# Patient Record
Sex: MALE | HISPANIC OR LATINO | URBAN - METROPOLITAN AREA
[De-identification: names, ages, dates, MRNs, and addresses within clinical notes are randomized per-mention and may not be internally consistent; named-entity substitution may affect disease eponyms.]

---
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
## 2023-03-07 ENCOUNTER — TELEPHONE (OUTPATIENT)
Dept: BEHAVIORAL HEALTH | Facility: CLINIC | Age: 18
End: 2023-03-07
Payer: COMMERCIAL

## 2023-03-07 NOTE — TELEPHONE ENCOUNTER
Pt is a(n) adult (16+ out of HS) Seeking as eval for Adult EVITA Assessment for evaluation and recommendations. and is interested in Adult Evaluation only - no specific program requested.  Appointment scheduled by:  Parent/Guardian (do not run cost estimate if pt not calling for the appt themselves - send for bens)  Caller name:  Catalino    Caller phone #: [phone]  If in person, please state reason why:  NA  Brief reason for appt:  Pt reports interest in residential programs for substance use    Cost estimate Did not get completed.  Contact information verified/updated: Yes

## 2023-03-14 ENCOUNTER — TELEPHONE (OUTPATIENT)
Dept: BEHAVIORAL HEALTH | Facility: CLINIC | Age: 18
End: 2023-03-14

## 2023-03-14 NOTE — TELEPHONE ENCOUNTER
Patient have video appointment today at 3pm with [facility]. Writer placed a call this morning to check in. Writer got a hold of patient. Patient informed writer that patient have already completed an assessment and will not need the appt today. Writer will inform the provider, and red dot the appt today.

## 2023-03-14 NOTE — TELEPHONE ENCOUNTER
This patient was a same-day late cancellation/no show to his 3:00 pm substance use disorder assessment appointment today on 3/14/2023.  The patient had contact with the [facility] on 3/14/2023, but the patient stated he had already completed a substance use disorder assessment at a different facility and he would need to cancel today's substance use disorder assessment appointment.

## 2025-02-18 ENCOUNTER — OFFICE VISIT (OUTPATIENT)
Dept: BEHAVIORAL HEALTH | Facility: CLINIC | Age: 20
End: 2025-02-18
Payer: COMMERCIAL

## 2025-02-18 VITALS — DIASTOLIC BLOOD PRESSURE: 68 MMHG | HEART RATE: 110 BPM | SYSTOLIC BLOOD PRESSURE: 111 MMHG | OXYGEN SATURATION: 100 %

## 2025-02-18 DIAGNOSIS — F41.8 ANXIETY WITH DEPRESSION: ICD-10-CM

## 2025-02-18 DIAGNOSIS — Z51.81 ENCOUNTER FOR MONITORING OPIOID MAINTENANCE THERAPY: ICD-10-CM

## 2025-02-18 DIAGNOSIS — Z79.891 ENCOUNTER FOR MONITORING OPIOID MAINTENANCE THERAPY: ICD-10-CM

## 2025-02-18 DIAGNOSIS — G47.00 INSOMNIA, UNSPECIFIED TYPE: ICD-10-CM

## 2025-02-18 DIAGNOSIS — F11.20 OPIOID USE DISORDER, SEVERE, DEPENDENCE (H): Primary | ICD-10-CM

## 2025-02-18 LAB
AMPHETAMINE QUAL URINE POCT: NEGATIVE
BARBITURATE QUAL URINE POCT: NEGATIVE
BENZODIAZEPINE QUAL URINE POCT: NEGATIVE
BUPRENORPHINE QUAL URINE POCT: ABNORMAL
COCAINE QUAL URINE POCT: NEGATIVE
CREAT UR-MCNC: 205 MG/DL
CREATININE QUAL URINE POCT: ABNORMAL
INTERNAL QC QUAL URINE POCT: ABNORMAL
MDMA QUAL URINE POCT: NEGATIVE
METHADONE QUAL URINE POCT: NEGATIVE
METHAMPHETAMINE QUAL URINE POCT: NEGATIVE
OPIATE QUAL URINE POCT: NEGATIVE
OXYCODONE QUAL URINE POCT: NEGATIVE
PH QUAL URINE POCT: ABNORMAL
PHENCYCLIDINE QUAL URINE POCT: NEGATIVE
POCT KIT EXPIRATION DATE: ABNORMAL
POCT KIT LOT NUMBER: ABNORMAL
SPECIFIC GRAVITY POCT: 1.01
TEMPERATURE URINE POCT: ABNORMAL
THC QUAL URINE POCT: ABNORMAL

## 2025-02-18 RX ORDER — MIRTAZAPINE 15 MG/1
15-30 TABLET, FILM COATED ORAL AT BEDTIME
Qty: 60 TABLET | Refills: 0 | Status: SHIPPED | OUTPATIENT
Start: 2025-02-18

## 2025-02-18 RX ORDER — CLONIDINE HYDROCHLORIDE 0.1 MG/1
0.1 TABLET ORAL 3 TIMES DAILY PRN
Qty: 30 TABLET | Refills: 0 | Status: SHIPPED | OUTPATIENT
Start: 2025-02-18

## 2025-02-18 RX ORDER — BUPRENORPHINE AND NALOXONE 12; 3 MG/1; MG/1
1 FILM, SOLUBLE BUCCAL; SUBLINGUAL 2 TIMES DAILY
Qty: 30 FILM | Refills: 0 | Status: SHIPPED | OUTPATIENT
Start: 2025-02-18

## 2025-02-18 ASSESSMENT — PATIENT HEALTH QUESTIONNAIRE - PHQ9: SUM OF ALL RESPONSES TO PHQ QUESTIONS 1-9: 13

## 2025-02-18 ASSESSMENT — COLUMBIA-SUICIDE SEVERITY RATING SCALE - C-SSRS
2. HAVE YOU ACTUALLY HAD ANY THOUGHTS OF KILLING YOURSELF?: NO
1. IN THE PAST MONTH, HAVE YOU WISHED YOU WERE DEAD OR WISHED YOU COULD GO TO SLEEP AND NOT WAKE UP?: NO
6. HAVE YOU EVER DONE ANYTHING, STARTED TO DO ANYTHING, OR PREPARED TO DO ANYTHING TO END YOUR LIFE?: NO

## 2025-02-18 NOTE — NURSING NOTE
[facility]      Rooming information:    Taking  meds from , last use of suboxone was this morning, suboxone 8mg, increase suboxone    Approximate last use of full opioid agonist: 2025, 1/2  gram fentanyl  Taking buprenorphine? No  How much per day? N/a  Number of buprenorphine films/tablets remaining currently: 0  Side effects related to buprenorphine (constipation, dry mouth, sedation?) No   Narcan currently available: No- need refills   Other recent substance use:    Cannabis   NICOTINE-Yes: vape and cigs 1-2 cigs per day   If using nicotine, ready to quit? No    Point of care urine drug screen positive for:  Lab Results   Component Value Date    BUP Screen Positive (A) 2025    BZO Negative 2025    BAR Negative 2025    CASIE Negative 2025    MAMP Negative 2025    AMP Negative 2025    MDMA Negative 2025    MTD Negative 2025    [id number] Negative 2025    OXY Negative 2025    PCP Negative 2025    THC Screen Positive (A) 2025    TEMP 90 F 2025    SGPOCT 1.015 2025       *POC urine drug screen does not screen for Fentanyl      Depression Response    Patient completed the PHQ-9 assessment for depression and scored >9? No  Question 9 on the PHQ-9 was positive for suicidality? No  Does patient have current mental health provider? No  C-SSRS screener risk level. C-SSRS Risk (Lifetime/Recent)  Calculated C-SSRS Risk Score (Lifetime/Recent): No Risk Indicated     Is this a virtual visit? No    I personally notified the following: visit provider          2025     2:00 PM   PHQ Assesment Total Score(s)   PHQ-9 Score 13         Housing needs: stable, living wth mom    Insurance: active     Current legal issues: court, pending    Contact information up to date? On file    3rd Party Involvement mommarycarmen signed  (please obtain MARYCARMEN if pt would like to include)    Rigoberto Fernandez MA  2025  2:51  PM

## 2025-02-18 NOTE — PROGRESS NOTES
[facility] Follow Up    ASSESSMENT/PLAN                                                      1. Opioid use disorder, severe, dependence (H) (Primary)  Patient was lost to follow up and returned to inhaled use, last use 2/11/25. He resumed suboxone from an old prescription, taking 16 mg/day. Last dose was this morning. Reporting persistent withdrawal and cravings.   Increase suboxone to 12 mg BID. Is not interested in sublocade.  Providing narcan access  Referral placed for taylor eval, scheduled for 2/20 at 3 pm  Encouraged plans for residential programming.   Recommend avoiding social and environmental triggers.   - Adult Mental Health  Referral; Future  - naloxone (NARCAN) 4 MG/0.1ML nasal spray; Spray 1 spray (4 mg) into one nostril alternating nostrils once as needed for opioid reversal. every 2-3 minutes until assistance arrives  Dispense: 0.2 mL; Refill: 11  - Buprenorphine HCl-Naloxone HCl (SUBOXONE) 12-3 MG FILM per film; Place 1 Film under the tongue 2 times daily.  Dispense: 30 Film; Refill: 0    2. Encounter for monitoring opioid maintenance therapy  - Drugs of Abuse Screen Urine (POC CUPS) POCT; Standing  - Drug Confirmation Panel Urine with Creat - lab collect; Future  - Drugs of Abuse Screen Urine (POC CUPS) POCT  - Drug Confirmation Panel Urine with Creat - lab collect    3. Insomnia, unspecified type  Has taken mirtazapine in the past but does not recall its effectiveness. Resume mirtazapine.   - mirtazapine (REMERON) 15 MG tablet; Take 1-2 tablets (15-30 mg) by mouth at bedtime.  Dispense: 60 tablet; Refill: 0    4. Anxiety with depression  Reporting increased anxiety impacting sleep. Trazodone and hydroxyzine ineffective for him in the past. Resume mirtazapine, and adding clonidine.   Consider referral for therapy at follow up.   - cloNIDine (CATAPRES) 0.1 MG tablet; Take 1 tablet (0.1 mg) by mouth 3 times daily as needed (opiate withdrawal (restlessness, anxiety,  sweats)).  Dispense: 30 tablet; Refill: 0  - mirtazapine (REMERON) 15 MG tablet; Take 1-2 tablets (15-30 mg) by mouth at bedtime.  Dispense: 60 tablet; Refill: 0       Return in about 15 days (around 3/5/2025) for Follow up, in person 1100.      Patient counseling completed today:  Discussed mechanism of action, potential risks/benefits/side effects of medications and other recommendations above.     Discussed risk of precipitated withdrawal with initiation of buprenorphine in the presence of full opioid agonists.    Reviewed directions for initiation of buprenorphine to reduce risk of precipitated withdrawal and maximize efficacy.    Harm reduction counseling including never use alone, availability of naloxone, risks associated with concurrent use of opioids and benzodiazepines, alcohol, or other sedatives, safer administration as applicable.  Discussed importance of avoiding isolation, building a network of supportive relationships, avoiding people/places/things associated with past use to reduce risk of relapse; including motivational interviewing regarding psychosocial interventions.   SUBJECTIVE                                                          CC/HPI:  Chacho Lucas is a 17 year old male with PMH of opioid use disorder  who presents to the Recovery Clinic for return visit.      First Recovery Clinic visit: 3/8/22  Last date of illicit opioid use: 10/19/22     Brief history of use:   Started using illicitly manufactured fentanyl tablets at age 15. Started with 1 pill per day. Taking 15 pills per day at first RC visit.  Prior to pills he was using cannabis and occasional xanax. Continues to smoke cannabis daily. He has tried to quit using pills twice, when he stopped he was taking Suboxone off the street. First RC visit on 3/8/22, start on 8 mg TDD - up to 12 mg if needed. He was lost follow-up, returning to clinic on 7/25/22.  Has had continued inconsistent adherence to buprenorphine rx and ongoing  use of fentanyl.  Started outpatient programming with [facility] 10/20/22.     Lost to follow up 10/21/2022, RTC 2/18/2025.        Recommendations last visit: 10/21/2022  1. Opioid use disorder, severe, dependence (H)  Last reported use 10/19/22.  No buprenorphine x6+ days.  Pt states he has #13 8mg/2mg films that are in his mother's possession.  Started OP programming with [facility] 10/20/22.  States he is agreeable to move to higher level of care if unable to stop using in OP setting.   Recommend he start buprenorphine today and take 16mg/day.  Pt agrees.   Harm reduction counseling including never use alone, availability of naloxone.      2. Pain of left calf  Recommend he go to UC or ED for further evaluation, including possible ultrasound to evaluate for DVT.  He expressed understanding.      02/18/25 HPI:  Lost to follow up and returned to use. Is using 1/2 gram of inhaled fentanyl,last use was 2/11/2025. Resumed suboxone 24 hours from last use and is taking 16 mg daily,. Has been taking from an old RX from 2022. Last dose was today.   On probation, needs to go to residential treatment. Did not like [facility]. Would like to go to treatment out of the [facility].    Substance Use History:   Opioids:   Age at first use: 15  Current use: timing of last use: ~9/5/22; substance: percs; quantity 15 pills daily; route: smoke and snort                 IV drug use: No   History of overdose: No  Previous treatments : No  Longest period of sobriety: 2 weeks  Medical complications related to substance use: denies  Hepatitis C: nonreactive on 3/8/22  HIV: nonreactive on 3/8/22     Other Addiction History:  Stimulants:   Past use: denies  Use in last 6 months: denies  Sedatives/hypnotics/anxiolytics:   Past use: 4252-0552 use Xanax once per month   Use in last 6 months: denies  Alcohol:   Past use: denies  Use in last 6 months: denies  Nicotine:   Cigarettes: sometimes  Vaping: denies  Chewing tobacco: denies  Cannabis:   Past use: daily  use, smoking,  Hallucinogens:   Past use: denies  Use in last 6 months: denies  Behavioral addictions:   Denies      PAST PSYCHIATRIC HISTORY:  Diagnoses- ADHD  Suicide Attempts: No   Hospitalizations: No          10/14/2022    11:00 AM 10/21/2022    11:00 AM 2/18/2025     2:00 PM   PHQ   PHQ-9 Total Score 19 19 13   Q9: Thoughts of better off dead/self-harm past 2 weeks Not at all Not at all Not at all     Social History  Housing status: with grandparents, mother, siblings and uncle  Employment status: Unemployed, not seeking work  Relationship status: Single  Children: no children  Legal: on probation  Insurance needs: active      Labs discussed with patient?  Yes      [place] Prescription Drug Monitoring Program Reviewed:  Yes  No prescription refills    Medications:  Current Outpatient Medications   Medication Sig Dispense Refill    Buprenorphine HCl-Naloxone HCl (SUBOXONE) 12-3 MG FILM per film Place 1 Film under the tongue 2 times daily. 30 Film 0    cloNIDine (CATAPRES) 0.1 MG tablet Take 1 tablet (0.1 mg) by mouth 3 times daily as needed (opiate withdrawal (restlessness, anxiety, sweats)). 30 tablet 0    mirtazapine (REMERON) 15 MG tablet Take 1-2 tablets (15-30 mg) by mouth at bedtime. 60 tablet 0    mirtazapine (REMERON) 15 MG tablet Take 1 tablet (15 mg) by mouth At Bedtime 30 tablet 1    naloxone (NARCAN) 4 MG/0.1ML nasal spray Spray 1 spray (4 mg) into one nostril alternating nostrils once as needed for opioid reversal. every 2-3 minutes until assistance arrives 0.2 mL 11    polyethylene glycol (MIRALAX) 17 GM/Dose powder Take 17 g (1 capful) by mouth 2 times daily as needed for constipation 850 g 11     No current facility-administered medications for this visit.       No Known Allergies    PMH, PSH, FamHx reviewed      OBJECTIVE                                                      /68   Pulse 110   SpO2 100%     Physical Exam  Cardiovascular:      Rate and Rhythm: Tachycardia present.    Pulmonary:      Effort: Pulmonary effort is normal. No respiratory distress.   Neurological:      General: No focal deficit present.      Mental Status: He is alert and oriented to person, place, and time.   Psychiatric:         Attention and Perception: Attention normal.         Mood and Affect: Mood normal.         Speech: Speech normal.         Behavior: Behavior is cooperative.         Thought Content: Thought content normal.         Judgment: Judgment normal.         Labs:    UDS:    Lab Results   Component Value Date    BUP Screen Positive (A) 02/18/2025    BZO Negative 02/18/2025    BAR Negative 02/18/2025    CASIE Negative 02/18/2025    MAMP Negative 02/18/2025    AMP Negative 02/18/2025    MDMA Negative 02/18/2025    MTD Negative 02/18/2025    [id number] Negative 02/18/2025    OXY Negative 02/18/2025    PCP Negative 02/18/2025    THC Screen Positive (A) 02/18/2025    TEMP 90 F 02/18/2025    SGPOCT 1.015 02/18/2025     *POC urine drug screen does not screen for Fentanyl      Recent Results (from the past 240 hours)   Drugs of Abuse Screen Urine (POC CUPS) POCT    Collection Time: 02/18/25  3:08 PM   Result Value Ref Range    POCT Kit Lot Number [id number]     POCT Kit Expiration Date 08/05/2026     Temperature Urine POCT 90 F 90 F, 92 F, 94 F, 96 F, 98 F, 100 F    Specific [place] POCT 1.015 1.005, 1.015, 1.025    pH Qual Urine POCT 7 pH 4 pH, 5 pH, 7 pH, 9 pH    Creatinine Qual Urine POCT 20 mg/dL 20 mg/dL, 50 mg/dL, 100 mg/dL, 200 mg/dL    Internal QC Qual Urine POCT Valid Valid    Amphetamine Qual Urine POCT Negative Negative    Barbiturate Qual Urine POCT Negative Negative    Buprenorphine Qual Urine POCT Screen Positive (A) Negative    Benzodiazepine Qual Urine POCT Negative Negative    Cocaine Qual Urine POCT Negative Negative    Methamphetamine Qual Urine POCT Negative Negative    MDMA Qual Urine POCT Negative Negative    Methadone Qual Urine POCT Negative Negative    Opiate Qual Urine POCT Negative  Negative    Oxycodone Qual Urine POCT Negative Negative    Phencyclidine Qual Urine POCT Negative Negative    THC Qual Urine POCT Screen Positive (A) Negative                Continued Complex Management  The longitudinal plan of care for Opioid Use Disorder (OUD) was addressed during this visit. Due to the added complexity in care, I will continue to support Wei in the subsequent management and with ongoing continuity of care.    Nadeen Luis, [place] [id number] [place] [id number]  [phone]

## 2025-02-19 ENCOUNTER — OFFICE VISIT (OUTPATIENT)
Dept: BEHAVIORAL HEALTH | Facility: CLINIC | Age: 20
End: 2025-02-19
Payer: COMMERCIAL

## 2025-02-19 DIAGNOSIS — F11.20 OPIOID USE DISORDER, SEVERE, DEPENDENCE (H): Primary | ICD-10-CM

## 2025-02-19 PROCEDURE — 99207 PR NO CHARGE LOS: CPT | Performed by: SURGERY

## 2025-02-19 NOTE — PROGRESS NOTES
[facility]    Peer  met with Chacho Lucas in the Recovery Clinic to introduce himself, detail services provided and discuss current status of recovery. Pt appeared alert, oriented and open to feedback during our discussion.     Pt arrives for visit with provider for suboxone.  PRC sees patient today under provider diagnosis of opioid substance disorder, severe, dependence  (H)       Subjective (S)- Pt requested information on treatment centers not in the [facility]    Objective (O)-  Pt was not in the clinic.  Work was done over the phone.    Assessment (A)- N/A    (P) - PRC searched, found and delivered 30 treatment centers appropriate for Pt    PRC provided business card to pt welcoming contact for recovery based support and resources. PRC and pt agree to speak again during an upcoming  visit.           Service Type:     Individual     Session Start Time:  11:03 am                     Session End Time:   1:03 pm     Session Length:    2 hours         Patient Goal:   To utilize suboxone assisted treatment for sobriety and long term recovery.     Goal Progress:   Ongoing.    Key Risk Factors to Recovery:   PRC encourages being aware of risk factors that can lead to re-use which include avoiding isolation, avoiding triggers and managing cravings in a healthy manner. being open and willing to acceptance and change on a daily basis.  PRC encourages pt to establish a sober network calling tree to reach out to when needed.  Continue to practice honesty with ourselves and trusted support person(s).   PRC encourages regular attendance at recovery based meetings as well as finding a sponsor for mentoring and accountability.   PRC encourages consideration of other services such as counseling for mental health issues which can correlate with our substance use.      Support Needs:   Ongoing care, support and resources for opioid substance use disorder as well as other  substances.     Follow up:   PRC has provided pt with his contact information for support and resource needs.    PRC and pt agree to meet during an upcoming  visit.       [facility]  [address], [address]   [place], [place], [address]  Clinic Phone: [phone]  Clinic Fax: [phone]      Open Monday - Friday  9:00am-4:00pm  Walk in hours: 9am-3pm      Quinton Bautista  February 19, 2025  2:45 PM    Leydi Cannon, Supervisor Outpatient Behavioral Programs:  provides clinical oversite and supervision of care.

## 2025-02-20 ENCOUNTER — HOSPITAL ENCOUNTER (OUTPATIENT)
Dept: BEHAVIORAL HEALTH | Facility: CLINIC | Age: 20
End: 2025-02-20
Attending: PSYCHIATRY & NEUROLOGY
Payer: COMMERCIAL

## 2025-02-20 VITALS — HEIGHT: 63 IN | WEIGHT: 220 LBS | BODY MASS INDEX: 38.98 KG/M2

## 2025-02-20 DIAGNOSIS — F11.20 OPIOID USE DISORDER, SEVERE, DEPENDENCE (H): ICD-10-CM

## 2025-02-20 DIAGNOSIS — F12.20 CANNABIS USE DISORDER, SEVERE, DEPENDENCE (H): Primary | ICD-10-CM

## 2025-02-20 DIAGNOSIS — F17.200 TOBACCO USE DISORDER, MODERATE, DEPENDENCE: ICD-10-CM

## 2025-02-20 LAB
BUPRENORPHINE UR CFM-MCNC: 219 NG/ML
BUPRENORPHINE/CREAT UR: 107 NG/MG {CREAT}
NALOXONE UR CFM-MCNC: 434 NG/ML
NALOXONE: 212 NG/MG {CREAT}
NORBUPRENORPHINE UR CFM-MCNC: 356 NG/ML
NORBUPRENORPHINE/CREAT UR: 174 NG/MG {CREAT}
NORFENTANYL UR CFM-MCNC: 6 NG/ML
NORFENTANYL/CREAT UR: 3 NG/MG {CREAT}

## 2025-02-20 PROCEDURE — H0001 ALCOHOL AND/OR DRUG ASSESS: HCPCS

## 2025-02-20 ASSESSMENT — ANXIETY QUESTIONNAIRES
6. BECOMING EASILY ANNOYED OR IRRITABLE: NEARLY EVERY DAY
IF YOU CHECKED OFF ANY PROBLEMS ON THIS QUESTIONNAIRE, HOW DIFFICULT HAVE THESE PROBLEMS MADE IT FOR YOU TO DO YOUR WORK, TAKE CARE OF THINGS AT HOME, OR GET ALONG WITH OTHER PEOPLE: SOMEWHAT DIFFICULT
3. WORRYING TOO MUCH ABOUT DIFFERENT THINGS: NEARLY EVERY DAY
7. FEELING AFRAID AS IF SOMETHING AWFUL MIGHT HAPPEN: NOT AT ALL
2. NOT BEING ABLE TO STOP OR CONTROL WORRYING: SEVERAL DAYS
8. IF YOU CHECKED OFF ANY PROBLEMS, HOW DIFFICULT HAVE THESE MADE IT FOR YOU TO DO YOUR WORK, TAKE CARE OF THINGS AT HOME, OR GET ALONG WITH OTHER PEOPLE?: SOMEWHAT DIFFICULT
7. FEELING AFRAID AS IF SOMETHING AWFUL MIGHT HAPPEN: NOT AT ALL
4. TROUBLE RELAXING: NEARLY EVERY DAY
GAD7 TOTAL SCORE: 16
5. BEING SO RESTLESS THAT IT IS HARD TO SIT STILL: NEARLY EVERY DAY
1. FEELING NERVOUS, ANXIOUS, OR ON EDGE: NEARLY EVERY DAY
GAD7 TOTAL SCORE: 16
GAD7 TOTAL SCORE: 16

## 2025-02-20 ASSESSMENT — PATIENT HEALTH QUESTIONNAIRE - PHQ9
SUM OF ALL RESPONSES TO PHQ QUESTIONS 1-9: 18
SUM OF ALL RESPONSES TO PHQ QUESTIONS 1-9: 18
10. IF YOU CHECKED OFF ANY PROBLEMS, HOW DIFFICULT HAVE THESE PROBLEMS MADE IT FOR YOU TO DO YOUR WORK, TAKE CARE OF THINGS AT HOME, OR GET ALONG WITH OTHER PEOPLE: VERY DIFFICULT

## 2025-02-20 ASSESSMENT — COLUMBIA-SUICIDE SEVERITY RATING SCALE - C-SSRS
2. HAVE YOU ACTUALLY HAD ANY THOUGHTS OF KILLING YOURSELF?: NO
1. HAVE YOU WISHED YOU WERE DEAD OR WISHED YOU COULD GO TO SLEEP AND NOT WAKE UP?: NO
5. HAVE YOU STARTED TO WORK OUT OR WORKED OUT THE DETAILS OF HOW TO KILL YOURSELF? DO YOU INTEND TO CARRY OUT THIS PLAN?: NO
1. IN THE PAST MONTH, HAVE YOU WISHED YOU WERE DEAD OR WISHED YOU COULD GO TO SLEEP AND NOT WAKE UP?: NO
3. HAVE YOU BEEN THINKING ABOUT HOW YOU MIGHT KILL YOURSELF?: NO
2. HAVE YOU ACTUALLY HAD ANY THOUGHTS OF KILLING YOURSELF?: NO
4. HAVE YOU HAD THESE THOUGHTS AND HAD SOME INTENTION OF ACTING ON THEM?: NO

## 2025-02-20 ASSESSMENT — PAIN SCALES - GENERAL: PAINLEVEL_OUTOF10: MODERATE PAIN (6)

## 2025-02-20 NOTE — PROGRESS NOTES
[facility]  Provider [facility]  [facility]     Telephone: [phone]      PATIENT'S NAME: Chacho Lucas  PREFERRED NAME: Wei  PRONOUNS: he/him/his    MRN: [id number]  : 2005  ADDRESS:   [address] [place] [id number]  E-MAIL: [email or web address]   ACCT. NUMBER:  [id number]  DATE OF SERVICE: 2025  START TIME: 3:10 pm  END TIME: 4:02 pm  PREFERRED PHONE: [phone]   May we leave a program related message: Yes  SERVICE MODALITY:  In-person:        Last 4 digits of SSN #: UNKNOWN    EMERGENCY CONTACT:   Fadi Lewis (grandfather)  Tel #: [phone]     Pascual Lucas (mother)  Tel #: [phone]     UNIVERSAL ADULT SUBSTANCE USE DISORDER DIAGNOSTIC ASSESSMENT    Identifying Information:  The patient is a 19 year old,  /  male.  The patient was referred for an assessment by the [facility].  The patient attended the session alone.     Chief Complaint:   The reason for seeking services at this time is:  The patient reported the reason for participating in the substance use disorder assessment today on 2025 was due to the patient's own awareness he needed help, due to pressure from his family members for him to get help and due to pressure from his medical providers for him to get help.  The patient reported he had been abusing Fentanyl powder and THC/cannabis on a daily basis and he had been unsuccessful in his attempts to stop his use on his own.  The patient reported he had just started on Medication Assisted Therapy with Suboxone as prescribed to him by the [facility].  The patient reported he had recently been homeless, but he had been back living with his grandparents and his mother for the past few weeks as he was attempting to get into a residential substance use disorder treatment program.  The patient had requested a referral to enter  the [facility] program at [facility] in [place], [place] for substance use disorder treatment.  The patient first had a concern about having substance abuse issues in 2020.  The patient reported he had attempted to stop his use of Fentanyl powder by attending substance use disorder treatment in the past.  The patient reported participating in 2 prior substance use disorder treatment programs, with the last substance use disorder treatment program being intensive outpatient treatment at [facility] around 2 years ago.  The patient reported he had failed to complete that treatment program.  The patient denied having any inpatient detoxification admissions or inpatient hospitalizations for withdrawal symptoms.  The patient is currently receiving the following services: The patient is currently working with the [facility] for Medication Assisted Therapy with Suboxone.  The patient denied having any recent attendance at 12-step or other recovery support group meetings.  The patient does not appear to be in severe withdrawal, an imminent safety risk to self or others, or requiring immediate medical attention and may proceed with the assessment interview.    Social/Family History:  The patient reported growing up in [place], [place].  The patient reported being raised by his mother and his grandparents.  The patient denied experiencing or witnessing any verbal, physical or sexual abuse when he was growing up in the family home.  The patient reported overall his childhood had been a combination of happy and challenging due to due to having difficulty with school and focusing on school.  The patient reported feeling supported by his grandfather when he was growing up.  The patient reported being raised in the [employer]  The patient described his current relationships with his family of origin as being good overall, but somewhat strained due to the patient's  substance abuse.      The patient describes his cultural background as being a  /  male.  Cultural influences and impact on patient's life structure, values, norms, and healthcare: The patient denied cultural concerns had an impact on life structure, values, norms, or healthcare.  Contextual influences on patient's health include: Family Factors: family history includes Substance Abuse in his brother.  The patient identified his preferred language to be English.  The patient reported he does not need the assistance of an  or other support involved in therapy.  The patient reported he is not currently involved in community erick activities.  The patient reported his spirituality would have a positive impact on his recovery.    The patient reported experiencing significant delays in developmental tasks, such as being diagnosed with ADHD as a child and being diagnosed with Dyslexia.  The patient's highest education level was some high school but no degree.  The patient identified the following learning problems: attention, concentration, reading, and writing.  The patient reports he is able to understand written materials.    The patient reported the following relationship history: The patient denied having any history of being .  The patient identified as being heterosexual and he reported being single and not in a romantic relationship at this time.  The patient denied having any children.     The patient's current living/housing situation involves staying with someone.  The patient reported living with his grandparents and his mother and he reported his housing is stable.  The patient denied having any concerns regarding his immediate living environment and/or neighborhood, but he had been unable to maintain abstinence from Fentanyl powder while living there.  The patient identified his grandparents and his mother as being his primary support network at this time.  The patient  identified the quality of his relationships with his support network as being good overall, but somewhat strained due to the patient's substance abuse.  The patient would like the following people involved in treatment services if recommended: None at this time.     The patient reported engaging in the following recreational/leisure activities: drawing, listening to music and making music.  The patient reported engaging in the following recreation/leisure activities while using alcohol or other non-prescribed mood altering chemicals: The patient's use of Fentanyl powder had been done independently of his social/recreational/leisure activities.  The patient reported the following people are supportive of his recovery: his grandparents and his mother.  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  The patient reported his income is obtained from family, but he had applied for [place] and EBT.  The patient reported having financial stressors at this time, including having no income at this time and money being tight at this time.  Cultural and socioeconomic factors do not affect the patient's access to services.    The patient reported the following substance related arrests or legal issues: The patient reported having 1 gross misdemeanor theft charge in [facility] in 5/30/2022.  The patient denied being on court probation at this time.    Patient's Strengths and Limitations:  The patient identified the following strengths or resources that will help him succeed in treatment: family support and motivation.  Things that may interfere with the patient's success in treatment include: lack of a sober peer support network, financial stressors, mental health concerns, peer network mainly consists of other alcohol and/or drug users, lack of social support, and lacks awareness regarding the risks and potential negative consequences of substance abuse.      Assessments:  The following assessments were completed by patient for this visit:  PHQ9:       9/23/2022     1:00 PM 9/29/2022    11:00 AM 10/7/2022    11:00 AM 10/14/2022    11:00 AM 10/21/2022    11:00 AM 2/18/2025     2:00 PM 2/20/2025     3:15 PM   PHQ-9 SCORE   PHQ-9 Total Score MyChart       18 (Moderately severe depression)   PHQ-9 Total Score 17 18 21 19 19 13 18        Patient-reported     GAD7:       2/14/2022     1:00 PM 2/20/2025     3:16 PM   MERVAT-7 SCORE   Total Score  16 (severe anxiety)   Total Score 6 16        Patient-reported     PROMIS 10-Global Health (all questions and answers displayed):       2/20/2025     3:17 PM   PROMIS 10   In general, would you say your health is: Good   In general, would you say your quality of life is: Good   In general, how would you rate your physical health? Good   In general, how would you rate your mental health, including your mood and your ability to think? Good   In general, how would you rate your satisfaction with your social activities and relationships? Good   In general, please rate how well you carry out your usual social activities and roles Good   To what extent are you able to carry out your everyday physical activities such as walking, climbing stairs, carrying groceries, or moving a chair? A little   In the past 7 days, how often have you been bothered by emotional problems such as feeling anxious, depressed, or irritable? Always   In the past 7 days, how would you rate your fatigue on average? Severe   In the past 7 days, how would you rate your pain on average, where 0 means no pain, and 10 means worst imaginable pain? 7   In general, would you say your health is: 3   In general, would you say your quality of life is: 3   In general, how would you rate your physical health? 3   In general, how would you rate your mental health, including your mood and your ability to think? 3   In general, how would you rate your satisfaction with your social  activities and relationships? 3   In general, please rate how well you carry out your usual social activities and roles. (This includes activities at home, at work and in your community, and responsibilities as a parent, child, spouse, employee, friend, etc.) 3   To what extent are you able to carry out your everyday physical activities such as walking, climbing stairs, carrying groceries, or moving a chair? 2   In the past 7 days, how often have you been bothered by emotional problems such as feeling anxious, depressed, or irritable? 5   In the past 7 days, how would you rate your fatigue on average? 4   In the past 7 days, how would you rate your pain on average, where 0 means no pain, and 10 means worst imaginable pain? 7   Global Mental Health Score 10    Global Physical Health Score 9    PROMIS TOTAL - SUBSCORES 19        Patient-reported     [place] Suicide Severity Rating Scale (Lifetime/Recent)      2/14/2022     1:00 PM 2/18/2025     2:00 PM 2/20/2025     3:00 PM   [place] Suicide Severity Rating (Lifetime/Recent)   Q1 Wish to be Dead (Lifetime) No     Q2 Non-Specific Active Suicidal Thoughts (Lifetime) No     Most Severe Ideation Rating (Lifetime) NA     Most Severe Ideation Description (Lifetime) NA     Frequency (Lifetime) NA     Duration (Lifetime) NA     Controllability (Lifetime) NA     Protective Factors  (Lifetime) NA     Reasons for Ideation (Lifetime) NA     RETIRED: 1. Wish to be Dead (Recent) No     RETIRED: 2. Non-Specific Active Suicidal Thoughts (Recent) No     3. Active Suicidal Ideation with any Methods (Not Plan) Without Intent to Act (Lifetime) No     RETIRED: 3. Active Suicidal Ideation with any Methods (Not Plan) Without Intent to Act (Recent) No     RETIRE: 4. Active Suicidal Ideation with Some Intent to Act, Without Specific Plan (Lifetime) No     4. Active Suicidal Ideation with Some Intent to Act, Without Specific Plan (Recent) No     RETIRE: 5. Active Suicidal Ideation with  Specific Plan and Intent (Lifetime) No     RETIRED: 5. Active Suicidal Ideation with Specific Plan and Intent (Recent) No     Most Severe Ideation Rating (Past Month) NA     Most Severe Ideation Description (Past Month) NA     Frequency (Past Month) NA     Duration (Past Month) NA     Controllability (Past Month) NA     Protective Factors (Past Month) NA     Reasons for Ideation (Past Month) NA     Actual Attempt (Lifetime) No     Actual Attempt (Past 3 Months) No     Has subject engaged in non-suicidal self-injurious behavior? (Lifetime) No     Has subject engaged in non-suicidal self-injurious behavior? (Past 3 Months) No     Interrupted Attempts (Lifetime) No     Interrupted Attempts (Past 3 Months) No     Aborted or Self-Interrupted Attempt (Lifetime) No     Aborted or Self-Interrupted Attempt (Past 3 Months) No     Preparatory Acts or Behavior (Lifetime) No     Preparatory Acts or Behavior (Past 3 Months) No     Most Recent Attempt Actual Lethality Code NA     Most Lethal Attempt Actual Lethality Code NA     Initial/First Attempt Actual Lethality Code NA     1. Wish to be Dead (Lifetime)   N   Wish to be Dead Description (Lifetime)   NA   1. Wish to be Dead (Past 1 Month)  N N   2. Non-Specific Active Suicidal Thoughts (Lifetime)   N   Non-Specific Active Suicidal Thought Description (Lifetime)   NA   2. Non-Specific Active Suicidal Thoughts (Past 1 Month)  N N   3. Active Suicidal Ideation with any Methods (Not Plan) Without Intent to Act (Lifetime)   N   4. Active Suicidal Ideation with Some Intent to Act, Without Specific Plan (Lifetime)   N   5. Active Suicidal Ideation with Specific Plan and Intent (Lifetime)   N   Calculated C-SSRS Risk Score (Lifetime/Recent)  No Risk Indicated No Risk Indicated     GAIN-sliding scale:      2/14/2022     1:00 PM 2/20/2025     3:00 PM   When was the last time that you had significant problems...   with feeling very trapped, lonely, sad, blue, depressed or hopeless about  the future? Past month 2 to 12 months ago   with sleep trouble, such as bad dreams, sleeping restlessly, or falling asleep during the day? Past Month Past Month   with feeling very anxious, nervous, tense, scared, panicked or like something bad was going to happen? Never 2 to 12 months ago   with becoming very distressed & upset when something reminded you of the past? Never 2 to 12 months ago   with thinking about ending your life or committing suicide? Never Never          2/14/2022     1:00 PM 2/20/2025     3:00 PM   When was the last time that you did the following things 2 or more times?   Lied or conned to get things you wanted or to avoid having to do something? 1+ years ago 2 to 12 months ago   Had a hard time paying attention at school, work or home? Never 2 to 12 months ago   Had a hard time listening to instructions at school, work or home? Never 2 to 12 months ago   Were a bully or threatened other people? Never Never   Started physical fights with other people? 1+ years ago Never     Personal and Family Medical History:  The patient did not report a family history of mental health concerns.  The patient reported family history includes Substance Abuse in his brother.     The patient reported the following previous mental health diagnoses: The patient reported a history of Depression NOS, Anxiety disorder NOS, and ADHD.  The patient reported his primary mental health symptoms include: depression, anxiety, sleep problems, and attentional problems and these do not impact his ability to function.  The patient has received mental health services in the past: The patient reported a history of being prescribed psychotropic medications, but he is not prescribed any psychotropic medications at this time.  The patient denied having any history of working with a 1:1 mental health therapist.  Psychiatric Hospitalizations: None.  The patient denies a history of civil commitment.  Current mental health  services/providers include:  The patient reported a history of being prescribed psychotropic medications, but he is not prescribed any psychotropic medications at this time.  The patient denied having any history of working with a 1:1 mental health therapist.    The patient has not had a physical exam to rule out medical causes for current symptoms.  Date of last physical exam was greater than a year ago and the patient was encouraged to schedule an exam with PCP.  The patient does not have a Primary Care Provider and was encouraged to establish care with a PCP.  The patient reported the following medical concerns:   Past Medical History:   Diagnosis Date    ADHD (attention deficit hyperactivity disorder)     Anxiety     Depressive disorder     Dyslexia     Insomnia    The patient reported taking his medications as prescribed and following the recommendations of his healthcare providers.  The patient reported pain concerns including some back pain.  The patient did not feel there was any need for additional help addressing this pain concerns.  The patient is male and is not pregnant.  There are not significant appetite / nutritional concerns / weight changes.  The patient does not report having a history of an eating disorder.  The patient does not report a history of head injury / trauma / cognitive impairment.      The patient reported current medications as:   Current Outpatient Medications   Medication Sig Dispense Refill    Buprenorphine HCl-Naloxone HCl (SUBOXONE) 12-3 MG FILM per film Place 1 Film under the tongue 2 times daily. 30 Film 0    cloNIDine (CATAPRES) 0.1 MG tablet Take 1 tablet (0.1 mg) by mouth 3 times daily as needed (opiate withdrawal (restlessness, anxiety, sweats)). 30 tablet 0    mirtazapine (REMERON) 15 MG tablet Take 1-2 tablets (15-30 mg) by mouth at bedtime. 60 tablet 0    mirtazapine (REMERON) 15 MG tablet Take 1 tablet (15 mg) by mouth At Bedtime 30 tablet 1    naloxone (NARCAN) 4  MG/0.1ML nasal spray Spray 1 spray (4 mg) into one nostril alternating nostrils once as needed for opioid reversal. every 2-3 minutes until assistance arrives 0.2 mL 11    polyethylene glycol (MIRALAX) 17 GM/Dose powder Take 17 g (1 capful) by mouth 2 times daily as needed for constipation 850 g 11     No current facility-administered medications for this encounter.     Medication Adherence:  The patient reported taking his prescribed medications as prescribed.  The patient reported being able  to self-administer his medications.    Patient Allergies:    No Known Allergies    Medical History:    Past Medical History:   Diagnosis Date    ADHD (attention deficit hyperactivity disorder)     Anxiety     Depressive disorder     Dyslexia     Insomnia       Substance Use:  The patient reported the following biological family members or relatives with chemical health issues: family history includes Substance Abuse in his brother.  The patient reported participating in 2 prior substance use disorder treatment programs, with the last substance use disorder treatment program being intensive outpatient treatment at [facility] around 2 years ago.  The patient reported he had failed to complete that treatment program.  The patient denied having any inpatient detoxification admissions or inpatient hospitalizations for withdrawal symptoms.  The patient is currently receiving the following services: The patient is currently working with the [facility] for Medication Assisted Therapy with Suboxone.  The patient denied having any recent attendance at 12-step or other recovery support group meetings.        Substance Age of first use Pattern and duration of use (include amounts and frequency) Date of last use     Withdrawal potential Route of use   Has used Alcohol 16 The patient reported his heaviest use of alcohol had been between the ages of 16 and 18, when he reported a pattern of drinking 3 beers around 3 times  per month on average.   18 No Oral   Has used Marijuana   16 The patient reported his heaviest use of THC/cannabis had been during the past 12-months, when he reported a pattern of vaping around 5 hits of THC/cannabis on a daily basis.    2/19/2025  No Vape   Has not used Amphetamines          Has not used Cocaine/crack           Has not used Hallucinogens        Has not used Inhalants        Has not used Heroin        Has used Other Opiates 16 The patient reported his heaviest use of Fentanyl powder had been during the past 12-months, when he reported a pattern of smoking a half a gram of Fentanyl powder on an almost daily basis.    The patient reported his longest period of time without using Fentanyl powder had been from 6/2024 until 9/2024 and his return to using Fentanyl powder had been due to having multiple life stressors at that time and using Fentanyl powder in an attempt to alleviate that stress.    1 week ago No Smoke   Has not used Benzodiazepines          Has not used Barbiturates        Has not used Over the counter medications        Has used Nicotine 18 The patient reported a current pattern of vaping nicotine on a daily basis.    2/20/2025  Yes  Vaping    Has use Caffeine 5 The patient reported a current pattern of drinking 1 cup of coffee around 2 times per week on average.    2/17/2025  No  Oral   Has not used other substances not listed above:  Identify:           The patient reported the following problems as a result of their substance use: relationship problems, family problems, legal issues, chronic health problems which were exacerbated by his use of Fentanyl powder, occupational / vocational problems, and academic.  The patient is concerned about substance use.  The patient reported his recovery goal is: The patient's plan and goal is to abstain from Fentanyl powder and from all other non-prescribed mood altering chemicals.     The patient reports experiencing the following withdrawal  symptoms within the past 12 months: sweating, shaky/jittery/tremors, unable to sleep, agitation, headache, fatigue, muscle aches, vivid/unpleasant dreams, irritability, high blood pressure, nausea/vomiting, dizziness, diarrhea, diminished appetite, unable to eat, confused/disrupted speech, and anxiety/worry and the following within the past 30 days: sweating, shaky/jittery/tremors, unable to sleep, agitation, headache, fatigue, muscle aches, vivid/unpleasant dreams, irritability, high blood pressure, nausea/vomiting, dizziness, diarrhea, diminished appetite, unable to eat, confused/disrupted speech, and anxiety/worry. (DSM-11)  The patient reported having urges to use THC/cannabis, Fentanyl powder, and nicotine.  (DSM-4)  The patient reported he has used more Fentanyl powder than intended and over a longer period of time than intended.  (DSM-1)  The patient reported he has had unsuccessful attempts to cut down or control use of THC/cannabis, Fentanyl powder, and nicotine.  (DSM-2)  The patient reported his longest period of time without using Fentanyl powder had been from 6/2024 until 9/2024 and his return to using Fentanyl powder had been due to having multiple life stressors at that time and using Fentanyl powder in an attempt to alleviate that stress.  The patient reported he has needed to use more THC/cannabis, Fentanyl powder, and nicotine to achieve the same effect.  (DSM-10)  The patient does report diminished effect with use of same amount of THC/cannabis, Fentanyl powder, and nicotine.  (DSM-10)     The patient does report a great deal of time is spent in activities necessary to obtain, use, or recover from THC/cannabis and Fentanyl powder effects.  (DSM-3)  The patient does report important social, occupational, or recreational activities are given up or reduced because of Fentanyl powder use.  (DSM-7)  THC/Cannabis and Fentanyl powder use is continued despite knowledge of having a persistent or recurrent  physical or psychological problem that is likely to have been caused or exacerbated by use.  (DSM-9)  The patient reported the following problem behaviors while under the influence of substances: The patient reported having relationship conflict with his mother and his other family members, being more impulsive, and being more socially isolated when under the influence of Fentanyl powder.  (DSM-6)  The patient denied having any recurrent use of Fentanyl powder in physically hazardous situations within the past 12-months.  (DSM-8)    The patient reported his substance use has not negatively impacted his ability to function in a school setting within the past 12-months.  (DSM-5)  The patient reported his substance use has negatively impacted his ability to function in a work setting.  The patient reported having difficulty obtaining steady employment due to his substance use.  (DSM-5)  The patient's demographics and history impact his recovery in the following ways: family history includes Substance Abuse in his brother.  The patient reported engaging in the following recreation/leisure activities while using alcohol or other non-prescribed mood altering chemicals: The patient's use of Fentanyl powder had been done independently of his social/recreational/leisure activities.  The patient reported the following people are supportive of his recovery: his grandparents and his mother.    The patient denied having current or past concerns regarding gambling and denied ever participating in a gambling treatment program.  The patient does not have other addictive behaviors he is concerned about at this time.    Dimension Scale Ratings:    Dimension 1 -  Acute Intoxication/Withdrawal: 0 - No Problem    Dimension 2 - Biomedical: 0 - No Problem    Dimension 3 - Emotional/Behavioral/Cognitive Conditions: 2 - Moderate Problem    Dimension 4 - Readiness to Change:  3 - Severe Problem    Dimension 5 - Relapse/Continued Use/  Continued Problem Potential: 4 - Extreme Problem    Dimension 6 - Recovery Environment:  3 - Severe Problem    Significant Losses / Trauma / Abuse / Neglect Issues:   The patient did not serve in the .  There are indications or report of significant loss, trauma, abuse or neglect issues related to: The patient denied having any history of being verbally, emotionally, physically, or sexually abused.  The patient has not been a victim of exploitation.  The patient reported having a history of trauma issues due to the death of multiple family members, including uncles and cousins, due to witnessing violence, and due to being the victim of crime.  Concerns for possible neglect are not present.    Safety Assessment:   The patient denies current or past homicidal ideation and behaviors.  The patient denied any current or past history of having suicidal ideation and he denied having any history of suicide attempts.  The patient denied having any history of self-injurious behavior.   The patient reported having mental health problems, using illicit drugs with unknown contents and purity, and having a risk for having an accidental drug overdose associated with substance use.  The patient reported substance use and reported impulsive decision making associated with mental health symptoms.  The patient denied current or past personal safety concerns.    The patient denied a history of assaultive behaviors.    The patient denied having any history of sexual assault behaviors.  The patient denied having any history of being registered as a sex offender.   The patient reported there are not any firearms in the home.    Patient reports the following protective factors: forward/future oriented thinking, adherence with prescribed medication, and commitment to well-being.       Risk Plan:  See Recommendations for Safety and Risk Management Plan.    Review of Symptoms per patient report:  Substance Use:  significant withdrawal  symptoms, substance use related mental health issues, daily use, cravings/urges to use, family relationship problems due to substance use, and social problems related to substance use.    Diagnostic Criteria:   1.)  Substance is often taken in larger amounts or over a longer period than was intended.  Met for:  Fentanyl powder.  2.)  There is persistent desire or unsuccessful efforts to cut down or control use of the substance.  Met for:  THC/cannabis, Fentanyl powder, and nicotine.  3.)  A great deal of time is spent in activities necessary to obtain the substance, use the substance, or recover from its effects.  Met for:  THC/cannabis and Fentanyl powder.  4.)  Craving, or a strong desire or urge to use the substance.  Met for:  THC/cannabis, Fentanyl powder, and nicotine.  5.)  Recurrent use of the substance resulting in a failure to fulfill major role obligations at work, school, or home.  Met for:  Fentanyl powder.  6.)  Continued use of the substance despite having persistent or recurrent social or interpersonal problems caused or exacerbated by the effects of its use.  Met for:  Fentanyl powder.  7.)  Important social, occupational, or recreational activities are given up or reduced because of the substance.  Met for:  Fentanyl powder.  9.)  Use of the substance is continued despite knowledge of having a persistent or recurrent physical or psychological problem that is likely to have been cause or exacerbated by the substance.  Met for:  THC/cannabis and Fentanyl powder.  10.)  Tolerance:  either a need for markedly increased amounts of the substance to achieve the desired effect or a markedly diminished effect with continued use of the same amount of the substance.  Met for:  THC/cannabis, Fentanyl powder, and nicotine.  11.)  Withdrawal:  either patient endorses characteristic withdrawal syndrome for the substance or the substance (or closely related substance) is taken to relieve or avoid withdrawal  symptoms.  Met for:  THC/cannabis, Fentanyl powder, and nicotine.    Collateral Contact Summary:   Collateral contacts contributing to this assessment:  The patient's electronic medical records were reviewed at time of assessment.    No additional collateral data had been obtained at the time of this documentation.     If court related records were reviewed, summarize here: None    Information from collateral contacts supported/largely agreed with information from the client and associated risk ratings.    Information in this assessment was obtained from the medical record and provided by the patient who is a fair historian.        The patient will have open access to his substance use disorder assessment medical record.    As evidenced by self report and criteria, the patient meets the following DSM-5 Diagnoses: (Sustained by DSM-5 Criteria Listed Above)      1.)  Opioid Use Disorder Severe - 304.00 (F11.20)  2.)  Cannabis Use Disorder Severe - 304.30 (F12.20)  3.)  Tobacco Use Disorder Moderate - 305.10 (F17.200)  4.)  Depression NOS, per patient self-report  5.)  Anxiety disorder NOS, per patient self-report  6.)  ADHD, per patient self-report    Specify if: In early remission:  After full criteria for alcohol/drug use disorder were previously met, none of the criteria for alcohol/drug use disorder have been met for at least 3 months but for less than 12 months (with the exception that Criterion A4,  Craving or a strong desire or urge to use alcohol/drug  may be met).     In sustained remission:   After full criteria for alcohol use disorder were previously met, none of the criteria for alcohol/drug use disorder have been met at any time during a period of 12 months or longer (with the exception that Criterion A4,  Craving or strong desire or urge to use alcohol/drug  may be met).     Specify if:   This additional specifier is used if the individual is in an environment where access to alcohol is  restricted.    Mild: Presence of 2-3 symptoms  Moderate: Presence of 4-5 symptoms  Severe: Presence of 6 or more symptoms    Functional Status:  The patient reported the following functional impairments:  work / vocational responsibilities.       EVITA/DUAL Programmatic Care:    1. Does the patient have a history of vulnerability such as being teased, picked on, or other indications of potential safety issues with other residents?  No    2. Does this patient have a history of being the victim of abuse? The patient denied having any history of being verbally, emotionally, physically, or sexually abused.    3. Does this patient have a history of victimizing others? No     4. Does the patient have a history of boundary violations?  No.    5. Does the patient have a history of other sexual acting out behaviors (e.g grooming)?   No    6. Does the patient have a history of threats to self or others? Fire setting, running away or other self-injurious behaviors?    No    7. Does the patient s history indicate the need for special precautions or particular staffing patterns in the facility?  No    NOTE: If this screening indicates that the patient is at risk to harm self or others, notify staff at referral location.    Recommendations:     1. Plan for Safety and Risk Management:     Recommended that patient call 911 or go to the local ED should there be a change in any of these risk factors.            Report to child / adult protection services was not needed.    2. EVITA Referrals:      Recommendations:      1.)  It was recommended the patient abstain from Fentanyl powder and from all other non-prescribed mood altering chemicals.   2.)  Have a mental health evaluation to address his current clinical mental health issues while on a residential or board and lodging substance use disorder treatment program unit.  3.)  Follow all of the recommendations of his medical and mental health providers.  4.)  Follow all of the terms and  conditions of his pending court probation, including participating in random alcohol and drug screening with court probation as directed.   5.)  Enter the [facility] program at [facility] in [place], [place] or enter a similar residential or board and lodging treatment program for substance use disorder treatment.  6.)  Follow all of the recommendations of his substance use disorder treatment providers including entering an extended care program as needed.        The patient reported he was willing to follow the above recommendations.      The patient meets criteria for the following levels of care based on ASAM Criteria:      Withdrawal Management - The patient would benefit from continuing to take Suboxone for Medication Assisted Therapy as prescribed by his Medication Assisted Therapy providers.    Treatment/Recovery Services - 3.5 Clinically Managed Medium and High Intensity Residential Services.      The patient's placement aligns with the assessment and placement level of care recommendation based on current ASAM Dimension scale ratings.     The patient would like the following family or other support people involved in their treatment:  None at this time.  The patient has a history of opioid abuse and was given treatment options, including Medication Assisted Treatment and information on the risks of opioid use disorder including recognizing and responding to opioid overdose.  The patient is currently receiving the following services: The patient is currently working with the [facility] for Medication Assisted Therapy with Suboxone.     3.  Mental Health Referrals:     The patient would benefit from having a mental health evaluation to address his current mental health issues while on the residential or board and lodging treatment program unit.    4. Clinical Substantiation for the above recommendations: The patient has been unable to maintain abstinence from Fentanyl powder  while living at his current home environment, would benefit from developing long-term sober maintenance skills, would benefit from developing sober coping skills, would benefit from developing a sober peer support network, has dual issues of mental health and substance abuse, has mental health symptoms which are exacerbated by substance abuse, and lacks awareness regarding the disease model of addiction.    5.  Cultural Concerns:    The patient did not identify having any cultural concerns regarding mental health, physical health, or substance use issues.     6. Recommendations for treatment focus:      Alcohol / Substance Use - See #2. EVITA Referrals above for details on recommendations.    7. Interactive Complexity: No    8. Safety Plan:  Patient denied any current/recent/lifetime history of suicidal ideation and/or behaviors.  No safety plan indicated at this time.     Provider Name/ Credentials:  ANGIE Pierre  February 20, 2025

## 2025-02-24 ENCOUNTER — TELEPHONE (OUTPATIENT)
Dept: BEHAVIORAL HEALTH | Facility: CLINIC | Age: 20
End: 2025-02-24
Payer: COMMERCIAL

## 2025-02-24 NOTE — TELEPHONE ENCOUNTER
----- Message from Sidney Liu sent at 2/21/2025  4:31 PM CST -----  Regarding: Wei DA[facility]  Please verify pt's benefits for LP. Thank you!

## 2025-02-26 ENCOUNTER — TELEPHONE (OUTPATIENT)
Dept: BEHAVIORAL HEALTH | Facility: CLINIC | Age: 20
End: 2025-02-26
Payer: COMMERCIAL

## 2025-02-26 NOTE — TELEPHONE ENCOUNTER
----- Message from Sidney Liu sent at 2/26/2025 12:57 PM CST -----  Regarding: Reschedule Admission  Pt will be rescheduled for tomorrow at 10am, his appointments can be pushed back by a day. Thank you!

## 2025-02-27 ENCOUNTER — TELEPHONE (OUTPATIENT)
Dept: BEHAVIORAL HEALTH | Facility: CLINIC | Age: 20
End: 2025-02-27
Payer: COMMERCIAL

## 2025-02-27 PROBLEM — F19.20 CHEMICAL DEPENDENCY (H): Status: ACTIVE | Noted: 2025-02-27

## 2025-02-27 NOTE — TELEPHONE ENCOUNTER
[facility] Appointment Request     Type of Request: Psychiatry / Addiction Medicine at:  [facility], [facility], 2nd floor [address]  [address]  [address]    [phone]    [facility] nurse contact phone number: [phone]    Pt is currently attending [facility] residential treatment and requesting long term psychiatric/addiction medicine stabilization/service.     Diagnosis: Opioid Use Disorder (OUD) and Marijuana Use Disorder    MH Diagnosis: Insomnia, Depression & Anxiety    What Would Be Helpful from the [facility]iatry Clinic: acute psychiatric medication stabilization to improve treatment participation     Needs: NO    Requesting in-person appt.  Pt discharge date from [facility] is: 3/26/25    If pt is seen after discharge from [facility], confirm if NEW Patient Packet should be sent to email or mailing address of patient choice:    Confirm Email address  unknown    Clinician Gender Preference (if applicable): he/him/his    [facility]  Nurse Liaison / CD [facility] ()  [address]  O:[phone]  F:[phone]  RN [place] [id number]

## 2025-03-01 ENCOUNTER — HOSPITAL ENCOUNTER (OUTPATIENT)
Dept: BEHAVIORAL HEALTH | Facility: CLINIC | Age: 20
Discharge: HOME | End: 2025-03-01
Attending: FAMILY MEDICINE
Payer: COMMERCIAL

## 2025-03-01 PROCEDURE — H2035 A/D TX PROGRAM, PER HOUR: HCPCS | Mod: HQ | Performed by: COUNSELOR

## 2025-03-01 PROCEDURE — 1002N00001 HC LODGING PLUS FACILITY CHARGE ADULT

## 2025-03-01 NOTE — GROUP NOTE
Psychoeducation Group Documentation    PATIENT'S NAME: Chacho Lucas  MRN:   [id number]  :   2005  ACCT. NUMBER: [id number]  DATE OF SERVICE: 3/01/25  START TIME:  9:00 AM  END TIME: 11:00 AM  FACILITATOR(S): Eliana Lyons LADC; Ashlee Bailey LADC; Zenia Buenrostro LADC  TOPIC: BEH Pyschoeducation  Number of patients attending the group:  24  Group Length:  2 Hours    Skills Group Therapy Type: Recovery skills    Summary of Group / Topics Discussed:    Relapse prevention skills    Patients listened to lectures regarding relapse prevention including triggers and coping skills.          Group Attendance:  Attended group session    Patient's response to the group topic/interactions:  cooperative with task and listened actively    Patient appeared to be Actively participating, Attentive, and Engaged.         Client specific details:  Patient actively participated in today's lecture/group.      Suicide ideation: 0.   : Action: No.    Self-harm thoughts: 0.  Action: No.

## 2025-03-01 NOTE — GROUP NOTE
Psychoeducation Group Documentation    PATIENT'S NAME: Chacho Lucas  MRN:   [id number]  :   2005  ACCT. NUMBER: [id number]  DATE OF SERVICE: 3/01/25  START TIME: 12:30 PM  END TIME:  2:30 PM  FACILITATOR(S): Eliana Lyons LADC; Ashlee Bailey LADC; Zenia Buenrostro LADC  TOPIC: BEH Pyschoeducation  Number of patients attending the group:  26  Group Length:  2 Hours    Skills Group Therapy Type: Recovery skills and Healthy behaviors development    Summary of Group / Topics Discussed:    Relapse prevention skills    Patients participated in two exercises to develop refusal skills and promote motivation for sobriety.          Group Attendance:  Attended group session    Patient's response to the group topic/interactions:  cooperative with task and listened actively    Patient appeared to be Actively participating, Attentive, and Engaged.         Client specific details:  Patient actively participated in today's lecture/exercise.

## 2025-03-02 ENCOUNTER — HOSPITAL ENCOUNTER (OUTPATIENT)
Dept: BEHAVIORAL HEALTH | Facility: CLINIC | Age: 20
Discharge: HOME | End: 2025-03-02
Attending: FAMILY MEDICINE
Payer: COMMERCIAL

## 2025-03-02 PROCEDURE — 1002N00001 HC LODGING PLUS FACILITY CHARGE ADULT

## 2025-03-02 PROCEDURE — H2035 A/D TX PROGRAM, PER HOUR: HCPCS | Mod: HQ | Performed by: COUNSELOR

## 2025-03-02 PROCEDURE — H2035 A/D TX PROGRAM, PER HOUR: HCPCS | Mod: HQ

## 2025-03-02 NOTE — GROUP NOTE
Group Therapy Documentation    PATIENT'S NAME: Chacho Lucas  MRN:   [id number]  :   2005  ACCT. NUMBER: [id number]  DATE OF SERVICE: 3/02/25  START TIME: 12:30 PM  END TIME:  1:30 PM  FACILITATOR(S): Ashlee Bailey LADC  TOPIC: BEH Group Therapy  Number of patients attending the group:  8    Group Length:  1 Hours    Dimensions addressed: 3, 4, and 5    Summary of Group / Topics Discussed:    Recovery Principles, Relationship/socialization, and Emotions/expression      Group Attendance:  Attended group session    Patient's response to the group topic/interactions:  cooperative with task    Patient appeared to be Actively participating, Attentive, and Engaged.        Client specific details:  Patient attended emotional regulation lecture and was attentive and participative.

## 2025-03-02 NOTE — PROGRESS NOTES
Pt and writer met to discuss pt missing suboxone doses yesterday. Pt said he is trying to ween down on it. Writer encouraged pt to take med as prescribed until he meets with addiction psych on 3/5. Pt verbalized agreement

## 2025-03-02 NOTE — GROUP NOTE
Psychoeducation Group Documentation    PATIENT'S NAME: Chacho Lucas  MRN:   [id number]  :   2005  ACCT. NUMBER: [id number]  DATE OF SERVICE: 3/02/25  START TIME:  8:30 AM  END TIME: 10:15 AM  FACILITATOR(S): Eliana Lyons LADC; Kristy Burns RN; Ashlee Bailey LADC  TOPIC: BEH Pyschoeducation  Number of patients attending the group:  25  Group Length:  2 Hours    Skills Group Therapy Type: Daily living/independence skills, Healthy behaviors development, and Medication education    Summary of Group / Topics Discussed:    Balanced lifestyle skills, Symptom management skills, and Medication management skills    Unit RN provided lecture related to self-care, nutrition, sleep, and pregnancy and substance use.          Group Attendance:  Attended group session    Patient's response to the group topic/interactions:  cooperative with task and listened actively    Patient appeared to be Attentive and Engaged.         Client specific details:  Patient appeared actively engaged in today's lecture.    Suicide ideation: 0.   : Action: No.    Self-harm thoughts: 0.  Action: No.

## 2025-03-03 ENCOUNTER — HOSPITAL ENCOUNTER (OUTPATIENT)
Dept: BEHAVIORAL HEALTH | Facility: CLINIC | Age: 20
Discharge: HOME | End: 2025-03-03
Attending: FAMILY MEDICINE
Payer: COMMERCIAL

## 2025-03-03 PROBLEM — F11.20 SEVERE OPIOID USE DISORDER (H): Status: ACTIVE | Noted: 2021-11-29

## 2025-03-03 PROCEDURE — 1002N00001 HC LODGING PLUS FACILITY CHARGE ADULT

## 2025-03-03 PROCEDURE — H2035 A/D TX PROGRAM, PER HOUR: HCPCS | Mod: HQ

## 2025-03-03 NOTE — GROUP NOTE
Group Therapy Documentation    PATIENT'S NAME: Chacho Lucas  MRN:   [id number]  :   2005  ACCT. NUMBER: [id number]  DATE OF SERVICE: 3/03/25  START TIME:  9:00 AM  END TIME: 11:00 AM  FACILITATOR(S): Mac Troncoso LADC  TOPIC: BEH Group Therapy  Number of patients attending the group:  7  Group Length:  2 Hours    Dimensions addressed: 3, 4, and 5    Summary of Group / Topics Discussed:    Recovery Principles, Spiritual Care, Sober coping skills, Mindfulness/Relaxation, Coping/DBT informed care, Trauma informed care, Disease of addiction, Emotions/expression, and Self-care activities      Group Attendance:  Attended group session    Patient's response to the group topic/interactions:  cooperative with task    Patient appeared to be Attentive and Engaged.        Client specific details:  Wei participated in morning group. He checked in and reported that he was feeling tired and good. He took part in the reading and discussion on slowing down in life and in recovery. This was followed by a group activity related to goal setting. The group ended with the patient listening to and giving positive feedback on his peers assignment.        Suicide ideation: 0.   : Action: No.    Self-harm thoughts: 0.  Action: No.

## 2025-03-03 NOTE — PROGRESS NOTES
Acknowledgement of Current Treatment Plan - Initial Treatment Plan       INITIAL TREATMENT PLAN:     1. I have met and reviewed my treatment plan with my clinician. I was given my assignments that correlate with treatment plan strategies.    Name Signature/Date   Chacho Lucas    Name of Clinician   Signature/Date   ANGIE Preston      2. I have completed and turned in my Safety Plan.  I am able to request a hard copy of this plan.    Patient signature/date:      _____________________________________________________________________________      3. Last Use Date: 2/26/2025    Patient signature/date:     _____________________________________________________________________________

## 2025-03-03 NOTE — GROUP NOTE
Group Therapy Documentation    PATIENT'S NAME: Chacho Lucas  MRN:   [id number]  :   2005  ACCT. NUMBER: [id number]  DATE OF SERVICE: 3/03/25  START TIME: 12:30 PM  END TIME:  2:30 PM  FACILITATOR(S): Johnson Pierre LADC; Wendy Darby  TOPIC: BEH Group Therapy  Number of patients attending the group:  7  Group Length:  2 Hours    Dimensions addressed: 1, 2, 3, 4, 5, and 6    Summary of Group / Topics Discussed:    Spiritual Care      Group Attendance:  Attended group session    Patient's response to the group topic/interactions:  cooperative with task    Patient appeared to be Actively participating.        Client specific details:  Wei participated and interacted appropriately with peers and staff in spiritual group. No triggers to use noted or discussed.

## 2025-03-04 ENCOUNTER — HOSPITAL ENCOUNTER (OUTPATIENT)
Dept: BEHAVIORAL HEALTH | Facility: CLINIC | Age: 20
Discharge: HOME | End: 2025-03-04
Attending: FAMILY MEDICINE
Payer: COMMERCIAL

## 2025-03-04 PROCEDURE — H2035 A/D TX PROGRAM, PER HOUR: HCPCS | Mod: HQ | Performed by: COUNSELOR

## 2025-03-04 PROCEDURE — 1002N00001 HC LODGING PLUS FACILITY CHARGE ADULT

## 2025-03-04 PROCEDURE — H2035 A/D TX PROGRAM, PER HOUR: HCPCS | Mod: HQ

## 2025-03-04 ASSESSMENT — ANXIETY QUESTIONNAIRES
5. BEING SO RESTLESS THAT IT IS HARD TO SIT STILL: NOT AT ALL
7. FEELING AFRAID AS IF SOMETHING AWFUL MIGHT HAPPEN: NOT AT ALL
3. WORRYING TOO MUCH ABOUT DIFFERENT THINGS: NOT AT ALL
2. NOT BEING ABLE TO STOP OR CONTROL WORRYING: NOT AT ALL
1. FEELING NERVOUS, ANXIOUS, OR ON EDGE: NOT AT ALL
GAD7 TOTAL SCORE: 0
6. BECOMING EASILY ANNOYED OR IRRITABLE: NOT AT ALL
4. TROUBLE RELAXING: NOT AT ALL
GAD7 TOTAL SCORE: 0

## 2025-03-04 ASSESSMENT — PATIENT HEALTH QUESTIONNAIRE - PHQ9
SUM OF ALL RESPONSES TO PHQ QUESTIONS 1-9: 3
10. IF YOU CHECKED OFF ANY PROBLEMS, HOW DIFFICULT HAVE THESE PROBLEMS MADE IT FOR YOU TO DO YOUR WORK, TAKE CARE OF THINGS AT HOME, OR GET ALONG WITH OTHER PEOPLE: NOT DIFFICULT AT ALL
SUM OF ALL RESPONSES TO PHQ QUESTIONS 1-9: 3

## 2025-03-04 NOTE — PROGRESS NOTES
[facility] Weekly Treatment Plan Review      ATTENDANCE for the following date span:  25 to 3/5/25      Weekly Treatment Plan Review     Treatment Plan initiated on: 3/3/25.    Dimension1: Acute Intoxication/Withdrawal Potential -   Previous Dimension Ratin  Current Dimension Ratin  Date of Last Use: Opioids 25, Cannabis 25  Any reports of withdrawal symptoms - No      Dimension 2: Biomedical Conditions & Complications -   Previous Dimension Ratin  Current Dimension Ratin  Medical Concerns:  None  Current Medications & Medication Changes:  Current Outpatient Medications   Medication Sig Dispense Refill    acetaminophen (TYLENOL) 500 MG tablet Take 500-1,000 mg by mouth every 8 hours as needed for mild pain.      alum & mag hydroxide-simethicone (MAALOX) 200-200-20 MG/5ML SUSP suspension Take 30 mLs by mouth every 6 hours as needed for indigestion.      benzocaine-menthol (CHLORASEPTIC) 6-10 MG lozenge Place 1 lozenge inside cheek every 2 hours as needed for sore throat.      Buprenorphine HCl-Naloxone HCl (SUBOXONE) 12-3 MG FILM per film Place 1 Film under the tongue 2 times daily. 30 Film 0    cloNIDine (CATAPRES) 0.1 MG tablet Take 1 tablet (0.1 mg) by mouth 3 times daily as needed (opiate withdrawal (restlessness, anxiety, sweats)). 30 tablet 0    guaiFENesin (ROBITUSSIN) 20 mg/mL liquid Take 10 mLs by mouth every 4 hours as needed for cough.      ibuprofen (ADVIL/MOTRIN) 200 MG tablet Take 200-600 mg by mouth every 6 hours as needed for pain.      loratadine (CLARITIN) 10 MG tablet Take 10 mg by mouth daily as needed for allergies.      melatonin 5 MG tablet Take 5-10 mg by mouth nightly as needed for sleep.      mirtazapine (REMERON) 15 MG tablet Take 1-2 tablets (15-30 mg) by mouth at bedtime. 60 tablet 0    naloxone (NARCAN) 4 MG/0.1ML nasal spray Spray 1 spray (4 mg) into one nostril alternating nostrils once as needed for opioid reversal. every 2-3 minutes until  assistance arrives 0.2 mL 11    polyethylene glycol (MIRALAX) 17 GM/Dose powder Take 17 g (1 capful) by mouth 2 times daily as needed for constipation 850 g 11    senna-docusate (SENOKOT-S/PERICOLACE) 8.6-50 MG tablet Take 2 tablets by mouth daily as needed for constipation.       No current facility-administered medications for this encounter.     Facility-Administered Medications Ordered in Other Encounters   Medication Dose Route Frequency Provider Last Rate Last Admin    Self Administer Medications: Behavioral Services   Does not apply See Admin Instructions Carly Hernandez MD         Medication Prescriber: (See Chart)  Taking meds as prescribed? Yes  Medication side effects or concerns: None  Outside medical appointments this week (list provider and reason for visit):  TBD    Narrative: Patient reports no biomedical issues or concerns that would interfere with his ability to complete [facility] program. Patient appears fully functioning and able to seek medical attention as needed.     Dimension 3: Emotional/Behavioral Conditions & Complications -   Previous Dimension Ratin  Current Dimension Ratin  PHQ2:       3/15/2022    11:00 AM 3/8/2022    10:00 AM   PHQ-2 (  [employer])   Q1: Little interest or pleasure in doing things 1 0   Q2: Feeling down, depressed or hopeless 0 0   PHQ-2 Score 1 0      GAD2:        No data to display              Mental health diagnosis Depression, anxiety and Insomnia  Date of last SIB:  N/A  Date of  last SI:  N/A  Date of last HI: N/A  Behavioral Targets: Follow recommendations of medical provider. Report any alcohol or drug use to counselor and any increase in withdrawal symptoms to nurse. Stabilize and maintain mental health.     Risk factors: Poor self esteem, mental and emotional health struggles, poor impulse control.    Protective factors:  positive relationships positive social network, sober network, and positive family connections, forward/future oriented  thinking, dedication to family/friends, safe and stable environment, regular sleep, effectively controls impulses, regular physical activity, sense of belonging  , purpose  , help seeking behaviors when distressed  , abstinence from substances, adherence with prescribed medication, agreement to use safety plan, structured day, uses community crisis resources, effective problem-solving skills, committment to well-being, sense of meaning, positive social skills, and healthy fear of risky behaviors or pain    Current MH Assignments:  Understanding Depression, Addiction, Anxiety Triggers and Warning Signs    Narrative:Patient is working to gain awareness and insight regarding how his substance abuse negatively affects his mental and emotional well being. Patient reports no issues with his mood this week. Patient reports that he's managed stressful situations by using his stress ball throughout the day.. Patient will be working on the above noted assignments in the coming weeks and will present them in group upon completion. Patient reports no suicidal ideation at this time.      [place] Suicide Severity Rating Scale (Lifetime/Recent)      2/14/2022     1:00 PM 2/18/2025     2:00 PM 2/20/2025     3:00 PM   [place] Suicide Severity Rating (Lifetime/Recent)   Q1 Wish to be Dead (Lifetime) No     Q2 Non-Specific Active Suicidal Thoughts (Lifetime) No     Most Severe Ideation Rating (Lifetime) NA     Most Severe Ideation Description (Lifetime) NA     Frequency (Lifetime) NA     Duration (Lifetime) NA     Controllability (Lifetime) NA     Protective Factors  (Lifetime) NA     Reasons for Ideation (Lifetime) NA     RETIRED: 1. Wish to be Dead (Recent) No     RETIRED: 2. Non-Specific Active Suicidal Thoughts (Recent) No     3. Active Suicidal Ideation with any Methods (Not Plan) Without Intent to Act (Lifetime) No     RETIRED: 3. Active Suicidal Ideation with any Methods (Not Plan) Without Intent to Act (Recent) No      RETIRE: 4. Active Suicidal Ideation with Some Intent to Act, Without Specific Plan (Lifetime) No     4. Active Suicidal Ideation with Some Intent to Act, Without Specific Plan (Recent) No     RETIRE: 5. Active Suicidal Ideation with Specific Plan and Intent (Lifetime) No     RETIRED: 5. Active Suicidal Ideation with Specific Plan and Intent (Recent) No     Most Severe Ideation Rating (Past Month) NA     Most Severe Ideation Description (Past Month) NA     Frequency (Past Month) NA     Duration (Past Month) NA     Controllability (Past Month) NA     Protective Factors (Past Month) NA     Reasons for Ideation (Past Month) NA     Actual Attempt (Lifetime) No     Actual Attempt (Past 3 Months) No     Has subject engaged in non-suicidal self-injurious behavior? (Lifetime) No     Has subject engaged in non-suicidal self-injurious behavior? (Past 3 Months) No     Interrupted Attempts (Lifetime) No     Interrupted Attempts (Past 3 Months) No     Aborted or Self-Interrupted Attempt (Lifetime) No     Aborted or Self-Interrupted Attempt (Past 3 Months) No     Preparatory Acts or Behavior (Lifetime) No     Preparatory Acts or Behavior (Past 3 Months) No     Most Recent Attempt Actual Lethality Code NA     Most Lethal Attempt Actual Lethality Code NA     Initial/First Attempt Actual Lethality Code NA     1. Wish to be Dead (Lifetime)   N   Wish to be Dead Description (Lifetime)   NA   1. Wish to be Dead (Past 1 Month)  N N   2. Non-Specific Active Suicidal Thoughts (Lifetime)   N   Non-Specific Active Suicidal Thought Description (Lifetime)   NA   2. Non-Specific Active Suicidal Thoughts (Past 1 Month)  N N   3. Active Suicidal Ideation with any Methods (Not Plan) Without Intent to Act (Lifetime)   N   4. Active Suicidal Ideation with Some Intent to Act, Without Specific Plan (Lifetime)   N   5. Active Suicidal Ideation with Specific Plan and Intent (Lifetime)   N   Calculated C-SSRS Risk Score (Lifetime/Recent)  No Risk  Indicated No Risk Indicated         Dimension 4: Treatment Acceptance / Resistance -   Previous Dimension Rating:  3  Current Dimension Rating:  3  EVITA Diagnosis:  Opioid Use Disorder, Severe F11.20-(304.00)                                Cannabis Use Disorder, Severe F12.20-(304.30)  Commitment to tx process/Stage of change- Patient appears to be in the contemplative stage of change at this time.    EVITA Assignments - First Step, Drug Use History    Narrative -Patient is working on increasing his internal motivation for change. Patient has attended meetings, groups and lectures on time and offers thoughtful and meaningful feedback to his peers regarding home work assignments and topics for group discussion. Patient reports that he's motivated by wanting to be a positive influence for his family. Patient met with counselors to discuss his treatment plan goals. Patient will be working on the above noted assignments and will present them in group upon completion.      Dimension 5: Relapse / Continued Problem Potential -   Previous Dimension Ratin  Current Dimension Ratin  Relapses this week - None  Urges to use - None  UA results - N/A    EVITA Assignments- Relapse Prevention Plan, Self Sabotage    Narrative- Patient reports no cravings this week. .Patient is working to gain awareness and insight regarding his triggers, cues and early warning signs for relapse. Patient reports that he's managed his cravings by focusing on treatment process.  Patient participated in the spirituality group, facilitated by Wendy Mcgowan and  counseling staff was present during group. Patient participated in weekend workshop on relapse prevention and completed all activities. Patient will be working on the above noted assignments and will present them in group upon completion.     Dimension 6: Recovery Environment -   Previous Dimension Rating:  3  Current Dimension Rating:  3  Family Involvement - Yes  Summarize attendance at  family groups and family sessions - TBD  Family supportive of treatment?  Yes  Recreational activities - Smoking and walking    Narrative - Patient attended all 12 Step meetings this week according to his treatment plan. Patient is working on developing his sober support network by spending free time with same-gender peers. Patient  will be working to obtain a sponsor while in [facility] program. Patient will be working with counselors and support staff to develop an aftercare treatment protocol conducive to continued sobriety and long term recovery.      Progress made on transition planning goals: Yes    Justification for Continued Treatment at this Level of Care: Risk ratings indicate continued need for this level of care. Pt has been unable to maintain abstinence from alcohol, drugs while at the outpatient level of care, lacks long-term sober maintenance skills, lacks sober coping skills and has medical issues which are exacerbated by substance abuse.     Treatment coordination activities this week:   work with counselors    Need for peer recovery support referral? No    Discharge Planning:  Target Discharge Date/Timeframe:  3/27/25   Med Mgmt Provider/Appt:  ALANNAH   Ind therapy Provider/Appt:  ALANNAH   Family therapy Provider/Appt:  ALANNAH   Other referrals:  TBD    Has vulnerable adult status changed? No    Interdisciplinary Clinical Supervision including: LADC and Mental health professional    Are Treatment Plan goals/objectives effective? Yes  *If no, list changes to treatment plan:    Are the current goals meeting client's needs? Yes  *If no, list the changes to treatment plan.    Patient Input / Response: Pt contributed to treatment plan review.    *Client agrees with any changes to the treatment plan: N/A  *Client received copy of changes: N/A  *Client is aware of right to access a treatment plan review: Yes

## 2025-03-04 NOTE — GROUP NOTE
"Group Therapy Documentation    PATIENT'S NAME: Chacho Lucas  MRN:   [id number]  :   2005  ACCT. NUMBER: [id number]  DATE OF SERVICE: 3/04/25  START TIME:  9:00 AM  END TIME: 11:00 AM  FACILITATOR(S): Eliana Lyons LADC  TOPIC: BEH Group Therapy  Number of patients attending the group:  7  Group Length:  2 Hours    Dimensions addressed: 3, 4, 5, and 6    Summary of Group / Topics Discussed:    Recovery Principles, Balanced lifestyle, Co-occurring illnesses symptom management, Coping/DBT informed care, Trauma informed care, Disease of addiction, Emotions/expression, Relapse prevention, and Self-care activities    Writer facilitated group psychotherapy regarding the above topics.  Patients discussed powerlessness and unmanageability and identified similarities in one another's stories.  Patients discussed the themes beneath addiction besides the compulsion to use the substance.        Group Attendance:  Attended group session    Patient's response to the group topic/interactions:  cooperative with task, discussed personal experience with topic, and listened actively    Patient appeared to be Actively participating and Engaged.        Client specific details:  Patient participated in today's group.  Patient checked in feeling, \"tired.\"  Patient's affect was congruent.  Patient discussed authentic communication and the importance of honesty.      Suicide ideation: 0.   : Action: No.    Self-harm thoughts: 0.  Action: No.      "

## 2025-03-04 NOTE — GROUP NOTE
Group Therapy Documentation    PATIENT'S NAME: Chacho Lucas  MRN:   [id number]  :   2005  ACCT. NUMBER: [id number]  DATE OF SERVICE: 3/04/25  START TIME: 12:30 PM  END TIME:  2:30 PM  FACILITATOR(S): Johnson Pierre LADC  TOPIC: BEH Group Therapy  Number of patients attending the group:  7  Group Length:  2 Hours    Dimensions addressed: 1, 2, 3, 4, 5, and 6    Summary of Group / Topics Discussed:    Recovery Principles, Sober coping skills, Cognitive behavioral therapy skills, Disease of addiction, Relapse prevention, and Self-care activities      Group Attendance:  Attended group session    Patient's response to the group topic/interactions:  cooperative with task    Patient appeared to be Actively participating.        Client specific details:  Wei participated and interacted appropriately with peers and staff in PM group. No triggers to use noted or discussed.

## 2025-03-04 NOTE — GROUP NOTE
Psychoeducation Group Documentation    PATIENT'S NAME: Chacho Lucas  MRN:   [id number]  :   2005  ACCT. NUMBER: [id number]  DATE OF SERVICE: 3/04/25  START TIME:  3:00 PM  END TIME:  4:00 PM  FACILITATOR(S): Mac Troncoso LADC; Dolly Abarca LADC  TOPIC: BEH Pyschoeducation  Number of patients attending the group:  6  Group Length:  1 Hours    Skills Group Therapy Type: Daily living/independence skills and Healthy behaviors development    Summary of Group / Topics Discussed:    Balanced lifestyle skills        Group Attendance:  Attended group session    Patient's response to the group topic/interactions:  cooperative with task    Patient appeared to be Attentive and Engaged.         Client specific details:  The patient participated in the afternoon skill on research studies that are put on by the [facility].

## 2025-03-04 NOTE — PROGRESS NOTES
CARE CONFERENCE:    Pt was not in 8:45 am community huddle. Pt reported that he was sleeping. Pt has been late to multiple group start times. Writer stated that he needs to be on time to groups and lectures. If pt is late to groups or lectures and is talked to again by staff he should be discharged.

## 2025-03-05 ENCOUNTER — HOSPITAL ENCOUNTER (OUTPATIENT)
Dept: BEHAVIORAL HEALTH | Facility: CLINIC | Age: 20
Discharge: HOME | End: 2025-03-05
Attending: FAMILY MEDICINE
Payer: COMMERCIAL

## 2025-03-05 ENCOUNTER — OFFICE VISIT (OUTPATIENT)
Dept: PSYCHIATRY | Facility: CLINIC | Age: 20
End: 2025-03-05
Attending: PSYCHIATRY & NEUROLOGY
Payer: COMMERCIAL

## 2025-03-05 VITALS
DIASTOLIC BLOOD PRESSURE: 82 MMHG | WEIGHT: 145.6 LBS | SYSTOLIC BLOOD PRESSURE: 124 MMHG | BODY MASS INDEX: 25.79 KG/M2 | HEART RATE: 58 BPM

## 2025-03-05 DIAGNOSIS — G47.9 DISTURBANCE IN SLEEP BEHAVIOR: ICD-10-CM

## 2025-03-05 DIAGNOSIS — F11.20 OPIOID USE DISORDER, SEVERE, DEPENDENCE (H): Primary | ICD-10-CM

## 2025-03-05 DIAGNOSIS — F43.10 PTSD (POST-TRAUMATIC STRESS DISORDER): ICD-10-CM

## 2025-03-05 DIAGNOSIS — F90.2 ADHD (ATTENTION DEFICIT HYPERACTIVITY DISORDER), COMBINED TYPE: ICD-10-CM

## 2025-03-05 DIAGNOSIS — F17.200 TOBACCO USE DISORDER, MODERATE, DEPENDENCE: ICD-10-CM

## 2025-03-05 DIAGNOSIS — F12.20 CANNABIS USE DISORDER, SEVERE, DEPENDENCE (H): ICD-10-CM

## 2025-03-05 DIAGNOSIS — K08.89 TOOTH PAIN: ICD-10-CM

## 2025-03-05 PROBLEM — G47.00 INSOMNIA, UNSPECIFIED TYPE: Status: ACTIVE | Noted: 2025-03-05

## 2025-03-05 PROBLEM — F19.20 CHEMICAL DEPENDENCY (H): Status: RESOLVED | Noted: 2025-02-27 | Resolved: 2025-03-05

## 2025-03-05 PROCEDURE — 3079F DIAST BP 80-89 MM HG: CPT | Performed by: STUDENT IN AN ORGANIZED HEALTH CARE EDUCATION/TRAINING PROGRAM

## 2025-03-05 PROCEDURE — 3074F SYST BP LT 130 MM HG: CPT | Performed by: STUDENT IN AN ORGANIZED HEALTH CARE EDUCATION/TRAINING PROGRAM

## 2025-03-05 PROCEDURE — G0463 HOSPITAL OUTPT CLINIC VISIT: HCPCS | Performed by: STUDENT IN AN ORGANIZED HEALTH CARE EDUCATION/TRAINING PROGRAM

## 2025-03-05 PROCEDURE — 1002N00001 HC LODGING PLUS FACILITY CHARGE ADULT

## 2025-03-05 PROCEDURE — 99204 OFFICE O/P NEW MOD 45 MIN: CPT | Mod: GC | Performed by: STUDENT IN AN ORGANIZED HEALTH CARE EDUCATION/TRAINING PROGRAM

## 2025-03-05 PROCEDURE — H2035 A/D TX PROGRAM, PER HOUR: HCPCS | Mod: HQ

## 2025-03-05 PROCEDURE — 1126F AMNT PAIN NOTED NONE PRSNT: CPT | Performed by: STUDENT IN AN ORGANIZED HEALTH CARE EDUCATION/TRAINING PROGRAM

## 2025-03-05 RX ORDER — BUPRENORPHINE AND NALOXONE 12; 3 MG/1; MG/1
1 FILM, SOLUBLE BUCCAL; SUBLINGUAL DAILY
Qty: 30 FILM | Refills: 0 | Status: SHIPPED | OUTPATIENT
Start: 2025-03-05

## 2025-03-05 RX ORDER — MIRTAZAPINE 30 MG/1
30 TABLET, FILM COATED ORAL AT BEDTIME
Qty: 30 TABLET | Refills: 0 | Status: SHIPPED | OUTPATIENT
Start: 2025-03-05

## 2025-03-05 RX ORDER — CLONIDINE HYDROCHLORIDE 0.1 MG/1
0.1 TABLET ORAL 3 TIMES DAILY PRN
Qty: 90 TABLET | Refills: 0 | Status: SHIPPED | OUTPATIENT
Start: 2025-03-05

## 2025-03-05 RX ORDER — PRAZOSIN HYDROCHLORIDE 1 MG/1
1 CAPSULE ORAL AT BEDTIME
Qty: 30 CAPSULE | Refills: 0 | Status: SHIPPED | OUTPATIENT
Start: 2025-03-05

## 2025-03-05 ASSESSMENT — PAIN SCALES - GENERAL: PAINLEVEL_OUTOF10: NO PAIN (0)

## 2025-03-05 NOTE — GROUP NOTE
Group Therapy Documentation    PATIENT'S NAME: Chacho Lucas  MRN:   [id number]  :   2005  ACCT. NUMBER: [id number]  DATE OF SERVICE: 3/05/25  START TIME:  8:30 AM  END TIME:  9:30 AM  FACILITATOR(S): Johnson Pierre LADC  TOPIC: BEH Group Therapy  Number of patients attending the group:  7  Group Length:  1 Hours    Dimensions addressed: 1, 2, 3, 4, 5, and 6    Summary of Group / Topics Discussed:    Recovery Principles and Disease of addiction      Group Attendance:  Excused from group session    Patient's response to the group topic/interactions:   Excused    Patient appeared to be Excused.        Client specific details:  Pt attended an appointment during group.

## 2025-03-05 NOTE — GROUP NOTE
Group Therapy Documentation    PATIENT'S NAME: Chacho Lucas  MRN:   [id number]  :   2005  ACCT. NUMBER: [id number]  DATE OF SERVICE: 3/05/25  START TIME:  9:30 AM  END TIME: 11:15 AM  FACILITATOR(S): Zenia Buenrostro LADC  TOPIC: BEH Group Therapy  Number of patients attending the group:  7  Group Length:  2 Hours    Dimensions addressed: 6    Summary of Group / Topics Discussed:    Recovery Principles and Relapse prevention    Group Attendance:  Attended group session    Patient's response to the group topic/interactions:  cooperative with task    Patient appeared to be Attentive and Engaged.        Client specific details: Wei participated appropriately in morning group.     Suicide ideation: 0.   : Action: No.    Self-harm thoughts: 0.  Action: No.
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
## 2025-03-05 NOTE — GROUP NOTE
Group Therapy Documentation    PATIENT'S NAME: Chacho Lucas  MRN:   [id number]  :   2005  ACCT. NUMBER: [id number]  DATE OF SERVICE: 3/05/25  START TIME: 12:30 PM  END TIME:  2:30 PM  FACILITATOR(S): Johnson Pierre LADC  TOPIC: BEH Group Therapy  Number of patients attending the group:  7  Group Length:  2 Hours    Dimensions addressed: 1, 2, 3, 4, 5, and 6    Summary of Group / Topics Discussed:    Recovery Principles      Group Attendance:  Attended group session    Patient's response to the group topic/interactions:  cooperative with task    Patient appeared to be Actively participating.        Client specific details:  Wei participated and interacted appropriately with peers and staff in PM group. No triggers to use noted or discussed.

## 2025-03-06 ENCOUNTER — HOSPITAL ENCOUNTER (OUTPATIENT)
Dept: BEHAVIORAL HEALTH | Facility: CLINIC | Age: 20
End: 2025-03-06
Attending: FAMILY MEDICINE
Payer: COMMERCIAL

## 2025-03-06 LAB — FENTANYL UR QL: NORMAL

## 2025-03-06 PROCEDURE — 80307 DRUG TEST PRSMV CHEM ANLYZR: CPT | Performed by: FAMILY MEDICINE

## 2025-03-06 PROCEDURE — H2035 A/D TX PROGRAM, PER HOUR: HCPCS

## 2025-03-06 PROCEDURE — H2035 A/D TX PROGRAM, PER HOUR: HCPCS | Mod: HQ | Performed by: COUNSELOR

## 2025-03-06 PROCEDURE — H2035 A/D TX PROGRAM, PER HOUR: HCPCS | Mod: HQ

## 2025-03-06 NOTE — GROUP NOTE
Group Therapy Documentation    PATIENT'S NAME: Chacho Lucas  MRN:   [id number]  :   2005  ACCT. NUMBER: [id number]  DATE OF SERVICE: 3/06/25  START TIME: 12:30 PM  END TIME:  2:30 PM  FACILITATOR(S): Johnson Pierre LADC; Ashlee Bailey LADC  TOPIC: BEH Group Therapy  Number of patients attending the group:  7  Group Length:  2 Hours    Dimensions addressed: 1, 2, 3, 4, 5, and 6    Summary of Group / Topics Discussed:    Recovery Principles and Disease of addiction      Group Attendance:  Attended group session    Patient's response to the group topic/interactions:  cooperative with task    Patient appeared to be Actively participating.        Client specific details:  Wei participated and interacted appropriately with peers and staff in PM group. No triggers to use noted or discussed. Pt attended an appointment for one hour during group.

## 2025-03-06 NOTE — GROUP NOTE
"Group Therapy Documentation    PATIENT'S NAME: Chacho Lucas  MRN:   [id number]  :   2005  ACCT. NUMBER: [id number]  DATE OF SERVICE: 3/06/25  START TIME:  9:00 AM  END TIME: 11:00 AM  FACILITATOR(S): Eliana Lyons LADC  TOPIC: BEH Group Therapy  Number of patients attending the group:  7  Group Length:  2 Hours    Dimensions addressed: 3, 4, 5, and 6    Summary of Group / Topics Discussed:    Recovery Principles, Sober coping skills, Balanced lifestyle, Trauma informed care, Disease of addiction, Emotions/expression, and Relapse prevention    Writer facilitated group psychotherapy regarding the above topics.  Patients discussed how to maintain balance in recovery, how to avoid fear based decision-making, and discussions on the progression of both recovery and the disease.        Group Attendance:  Attended group session    Patient's response to the group topic/interactions:  cooperative with task, discussed personal experience with topic, and listened actively    Patient appeared to be Actively participating, Attentive, and Engaged.        Client specific details:  Patient actively participated in today's group.  Patient checked in feeling, \"proud, confident.\"  Patient discussed fears about recovery and participated in discussions regarding how to use skills and tools to overcome fears and meet long term goals.      Suicide ideation: 0.   : Action: No.    Self-harm thoughts: 0.  Action: No.      "

## 2025-03-06 NOTE — GROUP NOTE
Psychoeducation Group Documentation    PATIENT'S NAME: Chacho Lucas  MRN:   [id number]  :   2005  ACCT. NUMBER: [id number]  DATE OF SERVICE: 3/06/25  START TIME:  3:00 PM  END TIME:  4:00 PM  FACILITATOR(S): Eliana Lyons LADC; Zenia Buenrostro LADC; Kristy Burns RN  TOPIC: BEH Pyschoeducation  Number of patients attending the group:  28  Group Length:  1 Hours    Skills Group Therapy Type: Healthy behaviors development    Summary of Group / Topics Discussed:    Symptom management skills and Medication management skills    Unit RN provided Psychoeducational lecture regarding skills to create and maintain a helpful relationship with medical providers.  RN provided information on common medications used in residential level of care, how and why to stay on medications, and how to get off of medications safely.        Group Attendance:  Attended group session    Patient's response to the group topic/interactions:  cooperative with task and listened actively    Patient appeared to be Actively participating and Engaged.         Client specific details:  Patient appeared actively engaged in today's topic.

## 2025-03-06 NOTE — PROGRESS NOTES
Name: Chacho Lucas  Date: 3/5/2025  Medical Record: [id number]    Envelope Number: [id number]    List of Contents (List each item separately in new row):   Mirtazapine 15mg tablet    Admission:  I am responsible for any personal items that are not sent to the safe or pharmacy.  [facility] is not responsible for loss, theft or damage of any property in my possession.  Patient Signature:  ___________________________________________       Date/Time:__________________________    Staff Signature: __________________________________       Date/Time:__________________________    [facility] Staff person, if patient is unable/unwilling to sign:      __________________________________________________________       Date/Time: __________________________    **All medications are packaged by [facility] staff and securely stored on [facility]. Medications left by patients at discharge will be packaged by [facility] staff and transported by [facility] staff to inpatient pharmacy for storage.**  Discharge:  [facility] has returned all of my personal belongings:    Patient Signature: ________________________________________     Date/Time: ____________________________________    Staff Signature: ______________________________________     Date/Time:_____________________________________
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
## 2025-03-07 ENCOUNTER — HOSPITAL ENCOUNTER (OUTPATIENT)
Dept: BEHAVIORAL HEALTH | Facility: CLINIC | Age: 20
Discharge: HOME | End: 2025-03-07
Attending: FAMILY MEDICINE
Payer: COMMERCIAL

## 2025-03-07 PROCEDURE — H2035 A/D TX PROGRAM, PER HOUR: HCPCS | Mod: HQ | Performed by: COUNSELOR

## 2025-03-07 PROCEDURE — H2035 A/D TX PROGRAM, PER HOUR: HCPCS | Mod: HQ

## 2025-03-07 PROCEDURE — 1002N00001 HC LODGING PLUS FACILITY CHARGE ADULT

## 2025-03-07 NOTE — GROUP NOTE
Group Therapy Documentation    PATIENT'S NAME: Chacho Lucas  MRN:   [id number]  :   2005  ACCT. NUMBER: [id number]  DATE OF SERVICE: 3/07/25  START TIME: 12:30 PM  END TIME:  2:30 PM  FACILITATOR(S): Capri Robison LADC; Mac Troncoso LADC  TOPIC: BEH Group Therapy  Number of patients attending the group:  27  Group Length:  2 Hours    Dimensions addressed: 3, 4, 5, and 6    Summary of Group / Topics Discussed:    Recovery Principles, Relationship/socialization, Mindfulness/Relaxation, Emotions/expression, and Leisure explorations/use of leisure time  Therapeutic recreational therapy presents: The [employer]  Discussion and feedbacks after the film.       Group Attendance:  Attended group session    Patient's response to the group topic/interactions:  cooperative with task    Patient appeared to be Engaged.        Client specific details:  Wei attended PM group therapy. Wei remained respectful throughout group.

## 2025-03-07 NOTE — GROUP NOTE
"Group Therapy Documentation    PATIENT'S NAME: Chacho Lucas  MRN:   [id number]  :   2005  ACCT. NUMBER: [id number]  DATE OF SERVICE: 3/07/25  START TIME:  9:00 AM  END TIME: 11:00 AM  FACILITATOR(S): Eliana Lyons LADC  TOPIC: BEH Group Therapy  Number of patients attending the group:  7  Group Length:  2 Hours    Dimensions addressed: 3, 4, 5, and 6    Summary of Group / Topics Discussed:    Recovery Principles, Cognitive behavioral therapy skills, Mindfulness/Relaxation, Trauma informed care, Disease of addiction, Emotions/expression, and Relapse prevention    Writer facilitated group psychotherapy regarding the above topics.  Patients identified strategies for maintaining recovery goals.  Writer facilitated a discussion related to components of successful relapse prevention and discussed common personality characteristics of addicts/alcoholics that can create barriers to recovery.        Group Attendance:  Attended group session    Patient's response to the group topic/interactions:  cooperative with task, discussed personal experience with topic, and listened actively    Patient appeared to be Actively participating, Attentive, and Engaged.        Client specific details:  Patient actively participated in today's group.  Patient checked in feeling, \"confident.\"  Patient discussed some of his reasons to be in treatment and motivation for future recovery goals.      Suicide ideation: 0.   : Action: No.    Self-harm thoughts: 0.  Action: No.      "

## 2025-03-08 ENCOUNTER — HOSPITAL ENCOUNTER (OUTPATIENT)
Dept: BEHAVIORAL HEALTH | Facility: CLINIC | Age: 20
Discharge: HOME | End: 2025-03-08
Attending: FAMILY MEDICINE
Payer: COMMERCIAL

## 2025-03-08 PROCEDURE — H2035 A/D TX PROGRAM, PER HOUR: HCPCS | Mod: HQ

## 2025-03-08 PROCEDURE — 1002N00001 HC LODGING PLUS FACILITY CHARGE ADULT

## 2025-03-08 NOTE — GROUP NOTE
"Group Therapy Documentation    PATIENT'S NAME: Chacho Lucas  MRN:   [id number]  :   2005  ACCT. NUMBER: [id number]  DATE OF SERVICE: 3/08/25  START TIME:  9:00 AM  END TIME: 11:00 AM  FACILITATOR(S): Dolly Abarca LADC; Johnson Pierre LADC  TOPIC: BEH Group Therapy  Number of patients attending the group:  28  Group Length:  2 Hours    Dimensions addressed: 3, 4, and 5    Summary of Group / Topics Discussed:    Recovery Principles, Sober coping skills, Balanced lifestyle, Co-occurring illnesses symptom management, Disease of addiction, Emotions/expression, Medication management, and Relapse prevention      Group Topic : What do we hide in our addiction?    Groups discussions revolved around the concept of \" addiction icebergs.\"   Patients were given a handout of an iceberg, and reviewed the concept of what is seen/allowed to see above the surface and visible VS below the waterlevel, in the depts, hidden, ignored, dark.     Patients filled in the icebergs per individual situations and then shared them with staff and peers.   Patients processed the feelings/situations they feel/felt the need to keep hidden.   Coping Skills and alternatives to change behaviors was discussed as well.     Facilitator shared \"Mouse Party\" an online simulation of how drugs interact in the brain. The simultion was created by [facility] using cartooln mouse characters, that are seemingly under the influence of substances, and verbal explanation is provided for each substance and illustration of the interactions in the brain.       Group Attendance:  Attended group session    Patient's response to the group topic/interactions:  cooperative with task    Patient appeared to be Actively participating.        Client specific details:  Wei Attended small group therapy. Patient engaged in discussion and participated in sharing feedback to their peers.           Suicide ideation: 0.   : Action: No.    Self-harm " thoughts: 0.  Action: No      Dolly ROLAND, LADC           .

## 2025-03-08 NOTE — GROUP NOTE
"Group Therapy Documentation    PATIENT'S NAME: Chacho Lucas  MRN:   [id number]  :   2005  ACCT. NUMBER: [id number]  DATE OF SERVICE: 3/08/25  START TIME: 12:30 PM  END TIME:  2:30 PM  FACILITATOR(S): Dolly Abarca LADC; Johnson Pierre LADC  TOPIC: BEH Group Therapy  Number of patients attending the group:  27  Group Length:  2 Hours    Dimensions addressed: 2    Summary of Group / Topics Discussed:    Relationship/socialization, Emotions/expression, Relapse prevention, and Communication Skills       Facilitator: ANGIE Morejon presented a 120 min lecture.  Groups topic revolved around the concept of \"Communications in Relationships\"  Healthy vs Unhealthy Communication Skills and implementation of said skills were discussed.       Group Attendance:  Attended group session    Patient's response to the group topic/interactions:  cooperative with task    Patient appeared to be Actively participating.        Client specific details:  Wei Attended small group therapy. Patient engaged in discussion and participated in sharing feedback to their peers.         "

## 2025-03-09 ENCOUNTER — HOSPITAL ENCOUNTER (OUTPATIENT)
Dept: BEHAVIORAL HEALTH | Facility: CLINIC | Age: 20
Discharge: HOME | End: 2025-03-09
Attending: FAMILY MEDICINE
Payer: COMMERCIAL

## 2025-03-09 ENCOUNTER — TELEPHONE (OUTPATIENT)
Dept: BEHAVIORAL HEALTH | Facility: CLINIC | Age: 20
End: 2025-03-09
Payer: COMMERCIAL

## 2025-03-09 DIAGNOSIS — Z11.9 ENCOUNTER FOR SCREENING EXAMINATION FOR INFECTIOUS DISEASE: Primary | ICD-10-CM

## 2025-03-09 PROCEDURE — H2035 A/D TX PROGRAM, PER HOUR: HCPCS | Mod: HQ

## 2025-03-09 PROCEDURE — 1002N00001 HC LODGING PLUS FACILITY CHARGE ADULT

## 2025-03-09 NOTE — GROUP NOTE
Group Therapy Documentation    PATIENT'S NAME: Chacho Lucas  MRN:   [id number]  :   2005  ACCT. NUMBER: [id number]  DATE OF SERVICE: 3/09/25  START TIME:  9:00 AM  END TIME: 11:00 AM  FACILITATOR(S): Zenia Buenrostro LADC; Anni Min LADC  TOPIC: BEH Group Therapy  Number of patients attending the group:  28  Group Length:  2 Hours    Dimensions addressed: 2 and 5    Summary of Group / Topics Discussed:    Balanced lifestyle, Co-occurring illnesses symptom management, and Relapse prevention    Group Attendance:  Attended group session    Patient's response to the group topic/interactions:  cooperative with task    Patient appeared to be Attentive and Engaged.        Client specific details: Pt listened respectfully to a presentation on STIs and pregnancy in the context of addiction and took part in the related destigmatization activity.

## 2025-03-09 NOTE — TELEPHONE ENCOUNTER
[facility] pt has history of IV drug use and is therefore at risk for HIV & Hepatitis C  AND/OR, high risk sexual activity and requests testing for or is symptomatic of sexually transmitted disease.    Requesting testing.     [facility]  Nurse Liaison / CD [facility] ([facility])  [address]  O:[phone]  F:[phone]  RN [place] [id number]  LP After hours(UC):[phone]  LP admin counselor:[phone]  CD assess:[phone]

## 2025-03-09 NOTE — GROUP NOTE
Group Therapy Documentation    PATIENT'S NAME: Chacho Lucas  MRN:   [id number]  :   2005  ACCT. NUMBER: [id number]  DATE OF SERVICE: 3/09/25  START TIME: 12:30 PM  END TIME:  1:30 PM  FACILITATOR(S): Anni Min; Zenia Buenrostro LADC  TOPIC: BEH Group Therapy  Number of patients attending the group:  28  Group Length:  1 Hours    Dimensions addressed: 3, 4, 5, and 6    Summary of Group / Topics Discussed:    Recovery Principles, Sober coping skills, Relationship/socialization, Balanced lifestyle, Co-occurring illnesses symptom management, Trauma informed care, Disease of addiction, Emotions/expression, Leisure explorations/use of leisure time, Relapse prevention, and Self-care activities      Group Attendance:  Attended group session    Patient's response to the group topic/interactions:  cooperative with task, expressed understanding of topic, gave appropriate feedback to peers, and listened actively    Patient appeared to be Attentive and engaged.        Client specific details:  Client attended the entirety of group. Client appeared to be engaged throughout group and shared experiences with peers. Client listened actively throughout the group.

## 2025-03-10 ENCOUNTER — HOSPITAL ENCOUNTER (OUTPATIENT)
Dept: BEHAVIORAL HEALTH | Facility: CLINIC | Age: 20
Discharge: HOME | End: 2025-03-10
Attending: FAMILY MEDICINE
Payer: COMMERCIAL

## 2025-03-10 ENCOUNTER — LAB (OUTPATIENT)
Dept: LAB | Facility: CLINIC | Age: 20
End: 2025-03-10
Payer: COMMERCIAL

## 2025-03-10 DIAGNOSIS — Z11.9 ENCOUNTER FOR SCREENING EXAMINATION FOR INFECTIOUS DISEASE: ICD-10-CM

## 2025-03-10 LAB
HBV CORE AB SERPL QL IA: NONREACTIVE
HBV SURFACE AB SERPL IA-ACNC: <3.5 M[IU]/ML
HBV SURFACE AB SERPL IA-ACNC: NONREACTIVE M[IU]/ML
HBV SURFACE AG SERPL QL IA: NONREACTIVE
HCV AB SERPL QL IA: NONREACTIVE
HIV 1+2 AB+HIV1 P24 AG SERPL QL IA: NONREACTIVE

## 2025-03-10 PROCEDURE — 87491 CHLMYD TRACH DNA AMP PROBE: CPT

## 2025-03-10 PROCEDURE — 86704 HEP B CORE ANTIBODY TOTAL: CPT

## 2025-03-10 PROCEDURE — 1002N00001 HC LODGING PLUS FACILITY CHARGE ADULT

## 2025-03-10 PROCEDURE — H2035 A/D TX PROGRAM, PER HOUR: HCPCS | Mod: HQ

## 2025-03-10 PROCEDURE — 87591 N.GONORRHOEAE DNA AMP PROB: CPT

## 2025-03-10 PROCEDURE — 36415 COLL VENOUS BLD VENIPUNCTURE: CPT

## 2025-03-10 PROCEDURE — 86706 HEP B SURFACE ANTIBODY: CPT

## 2025-03-10 PROCEDURE — 87340 HEPATITIS B SURFACE AG IA: CPT

## 2025-03-10 PROCEDURE — 86803 HEPATITIS C AB TEST: CPT

## 2025-03-10 PROCEDURE — 86780 TREPONEMA PALLIDUM: CPT

## 2025-03-10 PROCEDURE — 86592 SYPHILIS TEST NON-TREP QUAL: CPT

## 2025-03-10 PROCEDURE — H2035 A/D TX PROGRAM, PER HOUR: HCPCS | Mod: HQ | Performed by: COUNSELOR

## 2025-03-10 PROCEDURE — 87389 HIV-1 AG W/HIV-1&-2 AB AG IA: CPT

## 2025-03-10 PROCEDURE — 86593 SYPHILIS TEST NON-TREP QUANT: CPT

## 2025-03-10 ASSESSMENT — ANXIETY QUESTIONNAIRES
GAD7 TOTAL SCORE: 0
6. BECOMING EASILY ANNOYED OR IRRITABLE: NOT AT ALL
3. WORRYING TOO MUCH ABOUT DIFFERENT THINGS: NOT AT ALL
4. TROUBLE RELAXING: NOT AT ALL
5. BEING SO RESTLESS THAT IT IS HARD TO SIT STILL: NOT AT ALL
2. NOT BEING ABLE TO STOP OR CONTROL WORRYING: NOT AT ALL
1. FEELING NERVOUS, ANXIOUS, OR ON EDGE: NOT AT ALL
GAD7 TOTAL SCORE: 0
7. FEELING AFRAID AS IF SOMETHING AWFUL MIGHT HAPPEN: NOT AT ALL

## 2025-03-10 ASSESSMENT — PATIENT HEALTH QUESTIONNAIRE - PHQ9: SUM OF ALL RESPONSES TO PHQ QUESTIONS 1-9: 0

## 2025-03-10 NOTE — PROGRESS NOTES
"CARE CONFERENCE:    RE: CASSANDRA 3/7/25    @ 1840, [facility] security informed the writer that the pt was seen meeting with family near the [facility]. The writer went to the lobby and spoke to the pt's mother and brother. The mother explained that family meetings during drop-offs had occurred before without reprimand. The writer clarified that drop-offs are for items only, not visitation, and the mother agreed it wouldn t happen again. Afterward, security reported seeing the pt s brother hand a \"clear june pack\" to the pt. The writer went to the pt's room, reiterated the no-visitor policy, and confiscated a homemade food item with the pt's consent. The pt denied receiving anything other than food and was asked to empty his pockets, but nothing was found. At 1905, the writer and staff searched the pt's room but found nothing related to the reported june pack. AVB     Counselors spoke with pt regarding [facility] expectations and rules. Counselors stated to pt that if this happens again he will be discharged. Pt acknowledged understanding. Pt reported that he has spoken with his mother about [facility] visiting and drop-off rules.    "

## 2025-03-10 NOTE — GROUP NOTE
"Group Therapy Documentation    PATIENT'S NAME: Chacho Lucas  MRN:   [id number]  :   2005  ACCT. NUMBER: [id number]  DATE OF SERVICE: 3/10/25  START TIME:  9:00 AM  END TIME: 11:00 AM  FACILITATOR(S): Eliana Lyons LADC  TOPIC: BEH Group Therapy  Number of patients attending the group:  7  Group Length:  2 Hours    Dimensions addressed: 3, 4, 5, and 6    Summary of Group / Topics Discussed:    Recovery Principles, Co-occurring illnesses symptom management, Trauma informed care, Disease of addiction, Emotions/expression, Medication management, Relapse prevention, and Self-care activities    Writer facilitated group psychotherapy regarding the above topics.  Patients discussed medication assisted therapy, resources within the community that can support one's recovery, and a peer presented an assignment on grief and loss.  Patients reported increased insight following the group.      Group Attendance:  Attended group session    Patient's response to the group topic/interactions:  cooperative with task, discussed personal experience with topic, and listened actively    Patient appeared to be Actively participating, Attentive, and Engaged.        Client specific details:  Patient actively participated in today's group.  Patient checked in feeling, \"alright, relaxed.\"  Patient identified strategies to utilize community resources and identified his motivation for abstinence from his identified substances.    Suicide ideation: 0.   : Action: No.    Self-harm thoughts: 0.  Action: No.        "

## 2025-03-10 NOTE — GROUP NOTE
Group Therapy Documentation    PATIENT'S NAME: Chacho Lucas  MRN:   [id number]  :   2005  ACCT. NUMBER: [id number]  DATE OF SERVICE: 3/10/25  START TIME: 12:30 PM  END TIME:  2:30 PM  FACILITATOR(S): Johnson Pierre LADC  TOPIC: BEH Group Therapy  Number of patients attending the group:  9  Group Length:  2 Hours    Dimensions addressed: 1, 2, 3, 4, 5, and 6    Summary of Group / Topics Discussed:    Recovery Principles, Sober coping skills, and Relapse prevention      Group Attendance:  Attended group session    Patient's response to the group topic/interactions:  cooperative with task    Patient appeared to be Actively participating.        Client specific details:  Wei participated and interacted appropriately with peers and staff in PM group. No triggers to use noted or discussed.

## 2025-03-11 ENCOUNTER — TELEPHONE (OUTPATIENT)
Dept: BEHAVIORAL HEALTH | Facility: CLINIC | Age: 20
End: 2025-03-11
Payer: COMMERCIAL

## 2025-03-11 ENCOUNTER — HOSPITAL ENCOUNTER (OUTPATIENT)
Dept: BEHAVIORAL HEALTH | Facility: CLINIC | Age: 20
Discharge: HOME | End: 2025-03-11
Attending: FAMILY MEDICINE
Payer: COMMERCIAL

## 2025-03-11 LAB
C TRACH DNA SPEC QL NAA+PROBE: NEGATIVE
N GONORRHOEA DNA SPEC QL NAA+PROBE: NEGATIVE
RPR SER QL: REACTIVE
RPR SER-TITR: NORMAL {TITER}
SPECIMEN TYPE: NORMAL
SPECIMEN TYPE: NORMAL
T PALLIDUM AB SER QL: REACTIVE

## 2025-03-11 NOTE — PROGRESS NOTES
"CARE CONFERENCE:    Pt did not attend 8:45am community huddle and arrived to 9am group one half hour late. Pt reported that he was \"sleeping.\" Writer informed pt that he was being discharged today due to frequent tardiness despite prior warnings of discharge if he is not on time. Pt declined a list of shelters stating that he \"didn't need them.\" Writer stated that he had some time to pack his belongings and gather his medications and items from security. [facility] supervisor and RN staff notified.    "

## 2025-03-11 NOTE — TELEPHONE ENCOUNTER
OLIVIER Senior...    pt was discharged form [facility] today due to beh.  Pt was not getting up on time in the morning and needed repeated reminders to get to programming on time    Thank you,    Zee Gan RN

## 2025-03-11 NOTE — GROUP NOTE
Group Therapy Documentation    PATIENT'S NAME: Chacho Lucas  MRN:   [id number]  :   2005  ACCT. NUMBER: [id number]  DATE OF SERVICE: 3/11/25  START TIME: 12:30 PM  END TIME: 2:30 PM  FACILITATOR(S): Ashlee Bailey LADC  TOPIC: BEH Group Therapy  Number of patients attending the group:  7    Group Length:  2 Hours    Dimensions addressed: 3, 4, and 5    Summary of Group / Topics Discussed:    Recovery Principles, Sober coping skills, Relationship/socialization, and Relapse prevention      Group Attendance:  Other - Patient was discharged from program    Patient's response to the group topic/interactions:   Patient was discharged from program    Patient appeared to be Patient was discharged from program.        Client specific details:  Patient was discharged from program.

## 2025-03-11 NOTE — TELEPHONE ENCOUNTER
----- Message from Johnson Pierre sent at 3/11/2025  1:53 PM CDT -----  Regarding: Discharge  This pt discharged today.

## 2025-03-11 NOTE — PROGRESS NOTES
COUNSELOR S DISCHARGE SUMMARY    Date: 3/11/2025     Program Name:  [facility]    Client Name: Chacho Lucas YOB: 2005      MR#:  [id number]    Referred by: Self       Release copies to: N/A      Admit date: 2025   Discharge Date: 3/11/2025    # of Days Attended: 11  Date Last Attended: 3/10/2025    Discharge Status:  Administrative Discharge. Due to pt's frequent tardiness and no-shows to [facility] programming despite warnings of discharge. Pt also had compliance issues with following visitation rules. A staff consult should be initiated if pt requires additional EVITA tx.     PROBLEMS PRESENTED AT ADMISSION:  (Include reasons & circumstances for admission)  Chacho Lucas is a 19 year old year old male Identified at Birth. Pt presented to the [facility] in [place], [place] for a substance use evaluation. Pt was seeking tx for opioid and cannabis use. Pt was a self referent. Pt reported a hx of physical pain concerns, depression NOS, anxiety disorder NOS, and ADHD. Pt reported unemployment and homelessness. Pt was assessed to be appropriate for EVITA tx at [facility].       Admitting diagnosis:   Opioid Use Disorder Severe - 304.00 (F11.20)  Cannabis Use Disorder Severe - 304.30 (F12.20)    PROGRAM PARTICIPATION:  While at [facility], Chacho Lucas received the following services to address substance use and mental health disorders.  he participated in:  Group Therapy, Psychiatric Medication Management, Recreation Activities, Spirituality Groups, DBT Skills Groups, AA/NA meetings , Life Skills Groups, and Psych-education Groups      PROGRESS:     Dimension 1 - Acute Intoxication/Withdrawal Potential:  Admission ASAM Risk Ratin Client can tolerate and cope with withdrawal discomfort. The client displays mild to moderate intoxication or signs and symptoms interfering with daily functioning but does not  immediately endanger self or others. Client poses minimal risk of severe withdrawal.  Discharge ASAM Risk Ratin Client can tolerate and cope with withdrawal discomfort. The client displays mild to moderate intoxication or signs and symptoms interfering with daily functioning but does not immediately endanger self or others. Client poses minimal risk of severe withdrawal.    Treatment goal(s):    Alleviate withdrawal symptoms. Must be reached in order to have services terminated?  Yes  Target Date: 3/27/25   Manage withdrawal symptoms. Must be reached in order to have services terminated?  Yes  Target Date: 3/27/25   Client will take medications as prescribed to manage withdrawal symptoms and cravings. Must be reached in order to have services terminated?  Yes  Target Date: 3/27/25     Progress toward goal(s):  Incomplete      Dimension 2 - Biomedical Conditions & Complications:  Admission ASAM Risk Ratin Client tolerates and nichelle with physical discomfort and is able to get the services that the client needs.  Discharge ASAM Risk Ratin Client tolerates and nichelle with physical discomfort and is able to get the services that the client needs.    Treatment goal(s):    Client will take all medications as prescribed. Must be reached in order to have services terminated?  Yes  Target Date: 3/27/25     Progress toward goal(s):  Incomplete      Dimension 3 - Emotional/Behavioral Conditions & Complications:  Admission ASAM Risk Ratin Client has difficulty with impulse control and lacks coping skills. Client has thoughts of suicide or harm to others without means; however, the thoughts may interfere with participation in some treatment activities. Client has difficulty functioning in significant life areas. Client has moderate symptoms of emotional, behavioral, or cognitive problems. Client is able to participate in most treatment activities.  Discharge ASAM Risk Ratin Client has difficulty with impulse  control and lacks coping skills. Client has thoughts of suicide or harm to others without means; however, the thoughts may interfere with participation in some treatment activities. Client has difficulty functioning in significant life areas. Client has moderate symptoms of emotional, behavioral, or cognitive problems. Client is able to participate in most treatment activities.    Treatment goal(s):    Client will strengthen protective factors.  Must be reached in order to have services terminated?  Yes  Target Date: 3/27/2025     Progress toward goal(s):  Incomplete      Dimension 4 - Treatment Acceptance/Resistance:  Admission ASAM Risk Rating: 3 Client displays inconsistent compliance, minimal awareness of either the client's addiction or mental disorder, and is minimally cooperative.  Discharge ASAM Risk Rating: 3 Client displays inconsistent compliance, minimal awareness of either the client's addiction or mental disorder, and is minimally cooperative.    Treatment goal(s):    Client will comply with treatment expectations.    Must be reached in order to have services terminated?  Yes Target Date: 3/27/2025     Progress toward goal(s):  Incomplete      Dimension 5 - Relapse/Continued Problem Potential:  Admission ASAM Risk Ratin No awareness of the negative impact of mental health problems or substance abuse. No coping skills to arrest mental health or addiction illnesses, or prevent relapse.  Discharge ASAM Risk Ratin No awareness of the negative impact of mental health problems or substance abuse. No coping skills to arrest mental health or addiction illnesses, or prevent relapse.    Treatment goal(s):    Client has established and utilizes a relapse prevention plan.  Must be reached in order to have services terminated?  Yes  Target Date: 3/27/2025     Progress toward goal(s):  Incomplete      Dimension 6 - Recovery Environment: (family, recreation, legal, education, etc.)  Admission ASAM Risk  "Rating: 3 Client is not engaged in structured, meaningful activity and the client's peers, family, significant other, and living environment are unsupportive, or there is significant criminal justice system involvement.  Discharge ASAM Risk Rating: 3 Client is not engaged in structured, meaningful activity and the client's peers, family, significant other, and living environment are unsupportive, or there is significant criminal justice system involvement.    Treatment goal(s):    Establish a transition plan connecting to culturally informed services in the community for post-treatment follow up care.  Must be reached in order to have services terminated?  Yes  Target Date: 3/27/2025     Progress toward goal(s):  Incomplete      Client strengths identified during treatment were: Pt reported his strengths as \"he has goals of getting a job and going back to school.\"     Client needs identified during treatment were: Sobriety, stabilize mental and physical health, address and manage difficult emotions, avoid relapse, identify and manage triggers to use, and find IOP and sober housing placement.    Discharge Diagnosis:    Opioid Use Disorder Severe - 304.00 (F11.20)  Cannabis Use Disorder Severe - 304.30 (F12.20)    Discharge Medications:   Current Outpatient Medications   Medication Sig Dispense Refill    acetaminophen (TYLENOL) 500 MG tablet Take 500-1,000 mg by mouth every 8 hours as needed for mild pain.      alum & mag hydroxide-simethicone (MAALOX) 200-200-20 MG/5ML SUSP suspension Take 30 mLs by mouth every 6 hours as needed for indigestion.      benzocaine-menthol (CHLORASEPTIC) 6-10 MG lozenge Place 1 lozenge inside cheek every 2 hours as needed for sore throat.      Buprenorphine HCl-Naloxone HCl (SUBOXONE) 12-3 MG FILM per film Place 1 Film under the tongue daily. 30 Film 0    cloNIDine (CATAPRES) 0.1 MG tablet Take 1 tablet (0.1 mg) by mouth 3 times daily as needed (opiate withdrawal (restlessness, anxiety, " sweats)). 90 tablet 0    guaiFENesin (ROBITUSSIN) 20 mg/mL liquid Take 10 mLs by mouth every 4 hours as needed for cough.      ibuprofen (ADVIL/MOTRIN) 200 MG tablet Take 200-600 mg by mouth every 6 hours as needed for pain.      loratadine (CLARITIN) 10 MG tablet Take 10 mg by mouth daily as needed for allergies.      melatonin 5 MG tablet Take 5-10 mg by mouth nightly as needed for sleep.      mirtazapine (REMERON) 30 MG tablet Take 1 tablet (30 mg) by mouth at bedtime. 30 tablet 0    naloxone (NARCAN) 4 MG/0.1ML nasal spray Spray 1 spray (4 mg) into one nostril alternating nostrils once as needed for opioid reversal. every 2-3 minutes until assistance arrives 0.2 mL 11    polyethylene glycol (MIRALAX) 17 GM/Dose powder Take 17 g (1 capful) by mouth 2 times daily as needed for constipation 850 g 11    prazosin (MINIPRESS) 1 MG capsule Take 1 capsule (1 mg) by mouth at bedtime. 30 capsule 0    senna-docusate (SENOKOT-S/PERICOLACE) 8.6-50 MG tablet Take 2 tablets by mouth daily as needed for constipation.       No current facility-administered medications for this encounter.     Facility-Administered Medications Ordered in Other Encounters   Medication Dose Route Frequency Provider Last Rate Last Admin    Self Administer Medications: Behavioral Services   Does not apply See Admin Instructions Carly Hernandez MD           Discharge Plan and Recommendations:  1.  Abstain from all mood-altering chemicals unless prescribed by a licensed medical provider, and take all medications as prescribed.  2.  Attend a minimum of three AA/NA/Sober support groups weekly in the community.  3.  Obtain a permanent male sponsor and maintain regular contact with him.  4.  Admit immediately into a residential or inpatient EVITA tx program and follow all recommendations.  5.  Continue to invest in building a sober support network.  6.  Continue to monitor and understand relapse triggers and stressors through the use and development of  healthy coping skills.  7.  Obtain a mental health therapist and attend all scheduled programming.  8.  Attend all scheduled medical appointments.  9.  Take medication as prescribed.    Living arrangements at discharge: Unknown. Pt declined shelter resources. Patient terminated services due administrative discharge due to tardiness and no-shows.  The following interventions were tried prior to discharge:  multiple warnings.  Staff offered assistance in accessing referrals listed below and the following crisis resources were given:  None. Pt declined. The following continued care recommendations have been made:  None    Prognosis: Unfavorable      ANGIE Morejon on 3/11/2025 at 10:04 AM

## 2025-03-14 ENCOUNTER — TELEPHONE (OUTPATIENT)
Dept: BEHAVIORAL HEALTH | Facility: CLINIC | Age: 20
End: 2025-03-14
Payer: COMMERCIAL

## 2025-03-21 ENCOUNTER — TELEPHONE (OUTPATIENT)
Dept: BEHAVIORAL HEALTH | Facility: CLINIC | Age: 20
End: 2025-03-21
Payer: COMMERCIAL

## 2025-04-29 ENCOUNTER — OFFICE VISIT (OUTPATIENT)
Dept: BEHAVIORAL HEALTH | Facility: CLINIC | Age: 20
End: 2025-04-29
Payer: COMMERCIAL

## 2025-04-29 VITALS — HEART RATE: 53 BPM | SYSTOLIC BLOOD PRESSURE: 101 MMHG | DIASTOLIC BLOOD PRESSURE: 64 MMHG | OXYGEN SATURATION: 98 %

## 2025-04-29 DIAGNOSIS — K59.03 OPIOID-INDUCED CONSTIPATION: ICD-10-CM

## 2025-04-29 DIAGNOSIS — F12.20 CANNABIS USE DISORDER, MODERATE, DEPENDENCE (H): ICD-10-CM

## 2025-04-29 DIAGNOSIS — F90.2 ADHD (ATTENTION DEFICIT HYPERACTIVITY DISORDER), COMBINED TYPE: ICD-10-CM

## 2025-04-29 DIAGNOSIS — F11.20 OPIOID USE DISORDER, SEVERE, DEPENDENCE (H): Primary | ICD-10-CM

## 2025-04-29 DIAGNOSIS — Z79.891 ENCOUNTER FOR MONITORING OPIOID MAINTENANCE THERAPY: ICD-10-CM

## 2025-04-29 DIAGNOSIS — Z51.81 ENCOUNTER FOR MONITORING OPIOID MAINTENANCE THERAPY: ICD-10-CM

## 2025-04-29 DIAGNOSIS — T40.2X5A OPIOID-INDUCED CONSTIPATION: ICD-10-CM

## 2025-04-29 DIAGNOSIS — F11.20 OPIOID USE DISORDER, SEVERE, DEPENDENCE (H): ICD-10-CM

## 2025-04-29 DIAGNOSIS — F12.20 CANNABIS USE DISORDER, MODERATE, DEPENDENCE (H): Primary | ICD-10-CM

## 2025-04-29 DIAGNOSIS — F17.200 NICOTINE USE DISORDER: ICD-10-CM

## 2025-04-29 DIAGNOSIS — F43.10 PTSD (POST-TRAUMATIC STRESS DISORDER): ICD-10-CM

## 2025-04-29 LAB
AMPHETAMINE QUAL URINE POCT: NEGATIVE
BARBITURATE QUAL URINE POCT: NEGATIVE
BENZODIAZEPINE QUAL URINE POCT: NEGATIVE
BUPRENORPHINE QUAL URINE POCT: ABNORMAL
COCAINE QUAL URINE POCT: NEGATIVE
CREATININE QUAL URINE POCT: ABNORMAL
INTERNAL QC QUAL URINE POCT: ABNORMAL
MDMA QUAL URINE POCT: NEGATIVE
METHADONE QUAL URINE POCT: NEGATIVE
METHAMPHETAMINE QUAL URINE POCT: NEGATIVE
OPIATE QUAL URINE POCT: NEGATIVE
OXYCODONE QUAL URINE POCT: NEGATIVE
PH QUAL URINE POCT: ABNORMAL
PHENCYCLIDINE QUAL URINE POCT: NEGATIVE
POCT KIT EXPIRATION DATE: ABNORMAL
POCT KIT LOT NUMBER: ABNORMAL
SPECIFIC GRAVITY POCT: 1.02
TEMPERATURE URINE POCT: ABNORMAL
THC QUAL URINE POCT: ABNORMAL

## 2025-04-29 PROCEDURE — 99207 PR NO CHARGE LOS: CPT

## 2025-04-29 RX ORDER — POLYETHYLENE GLYCOL 3350 17 G/17G
1 POWDER, FOR SOLUTION ORAL DAILY
Qty: 510 G | Refills: 0 | Status: SHIPPED | OUTPATIENT
Start: 2025-04-29

## 2025-04-29 RX ORDER — BUPRENORPHINE AND NALOXONE 12; 3 MG/1; MG/1
1 FILM, SOLUBLE BUCCAL; SUBLINGUAL DAILY
Qty: 8 FILM | Refills: 0 | Status: SHIPPED | OUTPATIENT
Start: 2025-04-29

## 2025-04-29 ASSESSMENT — PATIENT HEALTH QUESTIONNAIRE - PHQ9: SUM OF ALL RESPONSES TO PHQ QUESTIONS 1-9: 0

## 2025-04-29 NOTE — PROGRESS NOTES
[facility] - Recovery Clinic Follow Up    ASSESSMENT/PLAN                                                      1. Opioid use disorder, severe, dependence (H) (Primary)  Last use reported to be 2/11/25, although mother worried that pt has had intermittent use since discharge from [facility] 3/11. He has continued suboxone, taking 3-6 mg intermittently (~5/7 days per week). Interested in tapering and worried about potential dental complications.  Recommended pt continue for a minimum of 1 yr given risk for return to use with MOUD discontinuation and risk for subsequent death via overdose. Open to continuing at this time and agreed to increase dose back to 12 mg. Was open to discussion of sublocade, but decided against initiating at this time.  - Continue buprenorphine, increase back to 12 mg daily, encouraged daily use.   - Discussed rinsing mouth following SL buprenorphine dose, recommended follow up with dentist and orthodontist   - Confirms narcan access, mother aware of location  - Adult Mental Health  Referral; Future - Referral placed for new taylor eval, message sent to [facility] staff to re-evaluate pt for potential readmission, scheduled 5/7 3:00 pm    2. Encounter for monitoring opioid maintenance therapy  - Drugs of Abuse Screen Urine (POC CUPS) POCT; Standing  - Drug Confirmation Panel Urine with Creat - lab collect; Future  - Drugs of Abuse Screen Urine (POC CUPS) POCT  - Drug Confirmation Panel Urine with Creat - lab collect    3. Cannabis Use Disorder   Likely has mild - moderate use disorder at this time, previously diagnosed as severe. Not interested in cessation at this time. Discussed potential for withdrawal syndrome if he needs to discontinue during treatment.   - Offered gabapentin to assist with this, declines.   - TAYLOR eval     4. Nicotine use disorder  Regular nicotine vape usage at this time. Pre-contemplative regarding cessation or reduction in use. Likely would benefit  from NRT should he enter a residential program. Declines at this time.     4. PTSD  Started on clonidine and prazosin during lodging plus admission, reports not taking these medications regularly and has discontinued them at this time. No longer having issues with sleep. Denies flashbacks, nightmares, or other PTSD symptoms at this time.     5. ADHD   ADHD diagnosed in childhood and had IEP. Occasionally has issues with concentration but he had side effects with stimulants in the past. Overall goal is to avoid medications, but discussed that there are non-stimulant options for ADHD treatment should he reconsider.        No follow-ups on file.  Follow up in 1-2 weeks    Patient counseling completed today:  Discussed mechanism of action, potential risks/benefits/side effects of medications and other recommendations above.    Harm reduction counseling including never use alone, availability of naloxone, risks associated with concurrent use of opioids and benzodiazepines, alcohol, or other sedatives, safer administration as applicable.  Discussed importance of avoiding isolation, building a network of supportive relationships, avoiding people/places/things associated with past use to reduce risk of relapse; including motivational interviewing regarding psychosocial interventions.     SUBJECTIVE                                                          CC/HPI:  Chacho Lucas is a 17 year old male with PMH of opioid use disorder  who presents to the Recovery Clinic for return visit.      First Recovery Clinic visit: 3/8/22  Last date of illicit opioid use: 2/11/25     Brief history of use:   Started using illicitly manufactured fentanyl tablets at age 15. Started with 1 pill per day. Taking 15 pills per day at first  visit.  Prior to pills he was using cannabis and occasional xanax. Continues to smoke cannabis daily. He has tried to quit using pills twice, when he stopped he was taking Suboxone off the street. First  RC visit on 3/8/22, start on 8 mg TDD - up to 12 mg if needed. He was lost follow-up, returning to clinic on 7/25/22.  Has had continued inconsistent adherence to buprenorphine rx and ongoing use of fentanyl.  Started outpatient programming with [facility] 10/20/22.     Lost to follow up 10/21/2022, RTC 2/18/2025 and restarted on buprenorphine  Discharged from  3/11 due to behavioral issues (not attending group)  Evaluated at Psych[facility] Addiction Medicine clinic 3/5 with new diagnosis of PTSD.       Recommendations last visit: 2/18/2022  1. Opioid use disorder, severe, dependence (H) (Primary)  Patient was lost to follow up and returned to inhaled use, last use 2/11/25. He resumed suboxone from an old prescription, taking 16 mg/day. Last dose was this morning. Reporting persistent withdrawal and cravings.   Increase suboxone to 12 mg BID. Is not interested in sublocade.  Providing narcan access  Referral placed for taylor eval, scheduled for 2/20 at 3 pm  Encouraged plans for residential programming.   Recommend avoiding social and environmental triggers.   - Adult Mental Health  Referral; Future  - naloxone (NARCAN) 4 MG/0.1ML nasal spray; Spray 1 spray (4 mg) into one nostril alternating nostrils once as needed for opioid reversal. every 2-3 minutes until assistance arrives  Dispense: 0.2 mL; Refill: 11  - Buprenorphine HCl-Naloxone HCl (SUBOXONE) 12-3 MG FILM per film; Place 1 Film under the tongue 2 times daily.  Dispense: 30 Film; Refill: 0     2. Encounter for monitoring opioid maintenance therapy  - Drugs of Abuse Screen Urine (POC CUPS) POCT; Standing  - Drug Confirmation Panel Urine with Creat - lab collect; Future  - Drugs of Abuse Screen Urine (POC CUPS) POCT  - Drug Confirmation Panel Urine with Creat - lab collect     3. Insomnia, unspecified type  Has taken mirtazapine in the past but does not recall its effectiveness. Resume mirtazapine.   - mirtazapine (REMERON) 15 MG tablet; Take 1-2 tablets  "(15-30 mg) by mouth at bedtime.  Dispense: 60 tablet; Refill: 0     4. Anxiety with depression  Reporting increased anxiety impacting sleep. Trazodone and hydroxyzine ineffective for him in the past. Resume mirtazapine, and adding clonidine.   Consider referral for therapy at follow up.   - cloNIDine (CATAPRES) 0.1 MG tablet; Take 1 tablet (0.1 mg) by mouth 3 times daily as needed (opiate withdrawal (restlessness, anxiety, sweats)).  Dispense: 30 tablet; Refill: 0  - mirtazapine (REMERON) 15 MG tablet; Take 1-2 tablets (15-30 mg) by mouth at bedtime.  Dispense: 60 tablet; Refill: 0                   Return in about 15 days (around 3/5/2025) for Follow up, in person 1100.         04/29/25 HPI:  Pt seen alone initially and then mother joined him for a portion of the visit. Currently living with mother.     Reports that he has been doing well overall since his discharge from [facility] 3/11. States that he has just been smoking marijuana and has not used fentanyl since 2/11. Prior to this was using 0.5-1 gram per day. Reports that he has maintained fentanyl abstinence by \"staying strong minded.\"     States that he has been trying to taper down on his suboxone. Now taking 1/2 to 1/4 strip most days per week (estimates 5/7 days per week). He has been worried about dental complications. Has not seen a dentist in some time and has needs to go back to orthodontist to get braces removed. Does feel poorly on the days that he does not take his buprenorphine. Not interested in sublocade at this time but isma is on it and has done well for several years.     Reports that he has a BM daily, but mother states that he has in fact been quite constipated. Denies other suboxone side effects.     Main goal at this point is to go back to treatment, would like to return to lodging plus. Discharged from [facility] due to oversleeping group. Thinks this was due to day light savings and would do better if he had an alarm clock. Needs " to go to treatment to be compliant with Drug Court recommendations.     Sleeping 6-7 hrs per night and feels well rested when he wakes up. Does not report any ongoing nightmares, hypervigilance, flashbacks, or other PTDS symptoms.     ADHD was diagnosed in elementary school. Didn't like stimulants due to side effects, namely poor appetite. Mother feels that he still struggles with focus.      Last fentanyl use - 2/11/2025 per pt, Mother stated that she thinks that he has had a few fentanyl relapses in the last month  Alcohol- drinks on average 4 beers, ~ once per week  Nicotine- vaping thought the day  Cannabis- smoking 1 gram per day      Substance Use History:   Opioids:   Age at first use: 15  Current use: timing of last use: 2/11/25; substance: percs; quantity 15 pills daily; route: smoke and snort                 IV drug use: No   History of overdose: No  Previous treatments : No  Longest period of sobriety: 2 weeks  Medical complications related to substance use: denies  Hepatitis C: nonreactive on 3/8/22  HIV: nonreactive on 3/8/22     Other Addiction History:  Stimulants:   Past use: denies  Use in last 6 months: denies  Sedatives/hypnotics/anxiolytics:   Past use: 8533-4422 use Xanax once per month   Use in last 6 months: denies  Alcohol:   Past use: denies  Use in last 6 months: denies  Nicotine:   Cigarettes: sometimes  Vaping: denies  Chewing tobacco: denies  Cannabis:   Past use: daily use, smoking,  Hallucinogens:   Past use: denies  Use in last 6 months: denies  Behavioral addictions:   Denies      PAST PSYCHIATRIC HISTORY:  Diagnoses- ADHD, PTDS  Suicide Attempts: No   Hospitalizations: No          3/4/2025     1:45 PM 3/10/2025     2:00 PM 4/29/2025     9:00 AM   PHQ   PHQ-9 Total Score 3  0 0   Q9: Thoughts of better off dead/self-harm past 2 weeks Not at all Not at all Not at all       Patient-reported     Social History  Housing status: with grandparents, mother, siblings and uncle  Employment  status: Unemployed, not seeking work  Relationship status: Single  Children: no children  Legal: on probation  Insurance needs: active      Labs discussed with patient?  Yes      [place] Prescription Drug Monitoring Program Reviewed:  Yes  03/05/2025 03/05/2025 1 Suboxone 12 Mg-3 Mg Sl Film 30.00 30 [place] [id number] [facility] ([id number]) 0/0 12.00 mg Medicaid [place]   02/19/2025 02/18/2025 1 Suboxone 12 Mg-3 Mg Sl Film 30.00 15 [place] [id number] [facility] ([id number]) 0/0 24.00 mg Medicaid [place]       Medications:  Current Outpatient Medications   Medication Sig Dispense Refill    acetaminophen (TYLENOL) 500 MG tablet Take 500-1,000 mg by mouth every 8 hours as needed for mild pain.      alum & mag hydroxide-simethicone (MAALOX) 200-200-20 MG/5ML SUSP suspension Take 30 mLs by mouth every 6 hours as needed for indigestion.      benzocaine-menthol (CHLORASEPTIC) 6-10 MG lozenge Place 1 lozenge inside cheek every 2 hours as needed for sore throat.      Buprenorphine HCl-Naloxone HCl (SUBOXONE) 12-3 MG FILM per film Place 1 Film under the tongue daily. 30 Film 0    cloNIDine (CATAPRES) 0.1 MG tablet Take 1 tablet (0.1 mg) by mouth 3 times daily as needed (opiate withdrawal (restlessness, anxiety, sweats)). 90 tablet 0    guaiFENesin (ROBITUSSIN) 20 mg/mL liquid Take 10 mLs by mouth every 4 hours as needed for cough.      ibuprofen (ADVIL/MOTRIN) 200 MG tablet Take 200-600 mg by mouth every 6 hours as needed for pain.      loratadine (CLARITIN) 10 MG tablet Take 10 mg by mouth daily as needed for allergies.      melatonin 5 MG tablet Take 5-10 mg by mouth nightly as needed for sleep.      mirtazapine (REMERON) 30 MG tablet Take 1 tablet (30 mg) by mouth at bedtime. 30 tablet 0    naloxone (NARCAN) 4 MG/0.1ML nasal spray Spray 1 spray (4 mg) into one nostril alternating nostrils once as needed for opioid reversal. every 2-3 minutes until assistance arrives 0.2 mL 11    polyethylene glycol (MIRALAX) 17 GM/Dose powder Take 17 g (1 capful) by mouth 2 times  daily as needed for constipation 850 g 11    prazosin (MINIPRESS) 1 MG capsule Take 1 capsule (1 mg) by mouth at bedtime. 30 capsule 0    senna-docusate (SENOKOT-S/PERICOLACE) 8.6-50 MG tablet Take 2 tablets by mouth daily as needed for constipation.       No current facility-administered medications for this visit.     Facility-Administered Medications Ordered in Other Visits   Medication Dose Route Frequency Provider Last Rate Last Admin    Self Administer Medications: Behavioral Services   Does not apply See Admin Instructions Carly Hernandez MD           Allergies   Allergen Reactions    Peanut-Containing Drug Products Diarrhea     Denies true allergy       PMH, PSH, FamHx reviewed      OBJECTIVE                                                      /64   Pulse 53   SpO2 98%     Physical Exam  Constitutional:       Appearance: Normal appearance.   HENT:      Head: Normocephalic and atraumatic.   Eyes:      Conjunctiva/sclera: Conjunctivae normal.      Pupils: Pupils are equal, round, and reactive to light.   Pulmonary:      Effort: Pulmonary effort is normal. No respiratory distress.   Abdominal:      General: There is no distension.   Musculoskeletal:         General: Normal range of motion.      Cervical back: Normal range of motion.   Skin:     General: Skin is warm and dry.      Coloration: Skin is not jaundiced.   Neurological:      General: No focal deficit present.      Mental Status: He is alert and oriented to person, place, and time.   Psychiatric:         Attention and Perception: Attention normal.         Mood and Affect: Mood normal.         Speech: Speech normal.         Behavior: Behavior normal. Behavior is cooperative.         Thought Content: Thought content normal.         Judgment: Judgment normal.         Labs:  *POC urine drug screen does not screen for Fentanyl      Recent Results (from the past 240 hours)   Drugs of Abuse Screen Urine (POC CUPS) POCT    Collection Time: 04/29/25   9:32 AM   Result Value Ref Range    POCT Kit Lot Number [id number]     POCT Kit Expiration Date 08/05/2026     Temperature Urine POCT 94 F 90 F, 92 F, 94 F, 96 F, 98 F, 100 F    Specific Winnie POCT 1.025 1.005, 1.015, 1.025    pH Qual Urine POCT 5 pH 4 pH, 5 pH, 7 pH, 9 pH    Creatinine Qual Urine POCT 20 mg/dL 20 mg/dL, 50 mg/dL, 100 mg/dL, 200 mg/dL    Internal QC Qual Urine POCT Valid Valid    Amphetamine Qual Urine POCT Negative Negative    Barbiturate Qual Urine POCT Negative Negative    Buprenorphine Qual Urine POCT Screen Positive (A) Negative    Benzodiazepine Qual Urine POCT Negative Negative    Cocaine Qual Urine POCT Negative Negative    Methamphetamine Qual Urine POCT Negative Negative    MDMA Qual Urine POCT Negative Negative    Methadone Qual Urine POCT Negative Negative    Opiate Qual Urine POCT Negative Negative    Oxycodone Qual Urine POCT Negative Negative    Phencyclidine Qual Urine POCT Negative Negative    THC Qual Urine POCT Screen Positive (A) Negative                Continued Complex Management  The longitudinal plan of care for Opioid Use Disorder (OUD) was addressed during this visit. Due to the added complexity in care, I will continue to support Wei in the subsequent management and with ongoing continuity of care.    Landen Alanis MD  Addiction Medicine Fellow      [place] [id number] [address] [id number] [phone]    I, REX CAROLINA MD, personally saw this patient with the fellow and agree with the fellow's findings and plan of care as documented in the resident's note.  I personally reviewed the patient's vital signs, medications, labs     Key findings: reporting last use of fentanyl 2/11/25, taking low doses of buprenorphine.  Recommend increase of buprenorphine to 12mg/day.  EVITA eval update scheduled.  Follow-up in one week.   Pt left clinic without scheduling follow-up appt.

## 2025-04-29 NOTE — PROGRESS NOTES
[facility] Individual Session Summary     Session Start Time: 9:41 am     Session End Time: 9:50 am     Duration: 9 minutes      DATA: Peer  met with Chacho Lucas in the Recovery Clinic to introduce and to provide patient further support and resources for their recovery.  Engaged in a discussion regarding where pt is at in terms of their recovery. Pt was intoxicated.  Pt desires to get back into [facility] after being kicked out for rules violations.      Intervention: Facilitated an individual session, utilized active listening, provided validation, utilized motivational interviewing techniques, provided shared experience.    Assessment: Patient appeared Intoxicated.    Plan: Peer  will continue to provide support as needed. Provided patient with business card to pt encouraging pt to reach out to peer  if needed additional support and resources.        Patient Identified Goals  To follow up with the recommendation and getting in to treatment. and To continue to take my other medications as prescribed.    Support Needs:   Ongoing care, support and resources for opioid substance use disorder as well as other substances.       Key Risk Factors to Recovery:   PRC encourages being aware of risk factors that can lead to re-use which include avoiding isolation, avoiding triggers and managing cravings in a healthy manner. being open and willing to acceptance and change on a daily basis.  PRC encourages pt to establish a sober network calling tree to reach out to when needed.  Continue to practice honesty with ourselves and trusted support person(s).   PRC encourages regular attendance at recovery based meetings as well as finding a sponsor for mentoring and accountability.   PRC encourages consideration of other services such as counseling for mental health issues which can correlate with our substance use.      Quinton Bautista  April 29, 2025  3:28  PM    Attestation: Leydi Cannon, [facility], LADC Provides oversight and supervision of care.

## 2025-04-29 NOTE — NURSING NOTE
"[facility] - Recovery Clinic      Rooming information:    -would like to go back to [facility] for treatment     Approximate last use of full opioid agonist: 2/11/2025  Taking buprenorphine? Yes:   How much per day? 12mg daily  Number of buprenorphine films/tablets remaining currently: \"decent amount left\"   Side effects related to buprenorphine (constipation, dry mouth, sedation?) No   Narcan currently available: Yes  Other recent substance use:    Cannabis   NICOTINE-Yes: vape/cigs  If using nicotine, ready to quit? No    Point of care urine drug screen positive for:  Lab Results   Component Value Date    BUP Screen Positive (A) 04/29/2025    BZO Negative 04/29/2025    BAR Negative 04/29/2025    CASIE Negative 04/29/2025    MAMP Negative 04/29/2025    AMP Negative 04/29/2025    MDMA Negative 04/29/2025    MTD Negative 04/29/2025    HWE191 Negative 04/29/2025    OXY Negative 04/29/2025    PCP Negative 04/29/2025    THC Screen Positive (A) 04/29/2025    TEMP 94 F 04/29/2025    SGPOCT 1.025 04/29/2025       *POC urine drug screen does not screen for Fentanyl        Depression Response    Patient completed the PHQ-9 assessment for depression and scored >9? No  Question 9 on the PHQ-9 was positive for suicidality? No  Does patient have current mental health provider? No  C-SSRS screener risk level.       Is this a virtual visit? No    I personally notified the following: visit provider          4/29/2025     9:00 AM   PHQ Assesment Total Score(s)   PHQ-9 Score 0           Housing needs: stable, living [facility] mom     Insurance: active      Current legal issues: court, pending     Contact information up to date? On file     [employer] Involvement mom, marycarmen signed  (please obtain MARYCARMEN if pt would like to include)      Rigoberto Fernandez MA  April 29, 2025  9:28 AM    "

## 2025-05-07 ENCOUNTER — HOSPITAL ENCOUNTER (OUTPATIENT)
Dept: BEHAVIORAL HEALTH | Facility: CLINIC | Age: 20
Discharge: HOME | End: 2025-05-07
Attending: PSYCHIATRY & NEUROLOGY | Admitting: PSYCHIATRY & NEUROLOGY
Payer: COMMERCIAL

## 2025-05-07 VITALS — BODY MASS INDEX: 21.34 KG/M2 | WEIGHT: 125 LBS | HEIGHT: 64 IN

## 2025-05-07 DIAGNOSIS — Z51.81 ENCOUNTER FOR MONITORING OPIOID MAINTENANCE THERAPY: ICD-10-CM

## 2025-05-07 DIAGNOSIS — Z79.891 ENCOUNTER FOR MONITORING OPIOID MAINTENANCE THERAPY: ICD-10-CM

## 2025-05-07 PROCEDURE — H0001 ALCOHOL AND/OR DRUG ASSESS: HCPCS

## 2025-05-07 ASSESSMENT — PATIENT HEALTH QUESTIONNAIRE - PHQ9
10. IF YOU CHECKED OFF ANY PROBLEMS, HOW DIFFICULT HAVE THESE PROBLEMS MADE IT FOR YOU TO DO YOUR WORK, TAKE CARE OF THINGS AT HOME, OR GET ALONG WITH OTHER PEOPLE: NOT DIFFICULT AT ALL
SUM OF ALL RESPONSES TO PHQ QUESTIONS 1-9: 0
SUM OF ALL RESPONSES TO PHQ QUESTIONS 1-9: 0

## 2025-05-07 ASSESSMENT — COLUMBIA-SUICIDE SEVERITY RATING SCALE - C-SSRS
1. HAVE YOU WISHED YOU WERE DEAD OR WISHED YOU COULD GO TO SLEEP AND NOT WAKE UP?: NO
2. HAVE YOU ACTUALLY HAD ANY THOUGHTS OF KILLING YOURSELF?: NO
TOTAL  NUMBER OF INTERRUPTED ATTEMPTS LIFETIME: NO
6. HAVE YOU EVER DONE ANYTHING, STARTED TO DO ANYTHING, OR PREPARED TO DO ANYTHING TO END YOUR LIFE?: NO
ATTEMPT LIFETIME: NO
TOTAL  NUMBER OF ABORTED OR SELF INTERRUPTED ATTEMPTS LIFETIME: NO

## 2025-05-07 ASSESSMENT — PAIN SCALES - GENERAL: PAINLEVEL_OUTOF10: NO PAIN (0)

## 2025-05-07 NOTE — PROGRESS NOTES
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 the following DSM-5 Diagnoses: (Sustained by DSM-5 Criteria Listed Below)      1.)  Opioid Use Disorder Severe - 304.00 (F11.20), in early remission  2.)  Cannabis Use Disorder Severe - 304.30 (F12.20)  3.)  Tobacco Use Disorder Moderate - 305.10 (F17.200)  4.)  Depression NOS, per patient self-report  5.)  Anxiety disorder NOS, per patient self-report  6.)  ADHD, per patient self-report    A problematic pattern of alcohol/drug use leading to clinically significant impairment or distress, as manifested by at least two of the following, occurring within a 12-month period:    1.) Alcohol/drug is often taken in larger amounts or over a longer period than was intended.  2.) There is a persistent desire or unsuccessful efforts to cut down or control alcohol/drug use  3.) A great deal of time is spent in activities necessary to obtain alcohol, use alcohol, or recover from its effects.  4.) Craving, or a strong desire or urge to use alcohol/drug  5.) Recurrent alcohol/drug use resulting in a failure to fulfill major role obligations at work,  school or home.  6.) Continued alcohol use despite having persistent or recurrent social or interpersonal problems caused or exacerbated by the effects of alcohol/drug.  7.) Important social, occupational, or recreational activities are given up or reduced because of alcohol/drug use.  9.) Alcohol/drug use is continued despite knowledge of having a persistent or recurrent physical or psychological problem that is likely to have been caused or exacerbated by alcohol.  10.) Tolerance, as defined by either of the following: A need for markedly increased amounts of alcohol/drug to achieve intoxication or desired effect.  11.) Withdrawal, as manifested by either of the following: The characteristic withdrawal syndrome for alcohol/drug (refer to Criteria A and B of the criteria set for alcohol/drug withdrawal).    Specify if: In early remission:  After full criteria for alcohol/drug use disorder were previously met, none of the criteria for alcohol/drug use disorder have been met for at least 3 months but for less than 12 months (with the exception that Criterion A4,  Craving or a strong desire or urge to use alcohol/drug  may be met).     In sustained remission:   After full criteria for alcohol use disorder were previously met, non of the criteria for alcohol/drug use disorder have been met at any time during a period of 12 months or longer (with the exception that Criterion A4,  Craving or strong desire or urge to use alcohol/drug  may be met).     Specify if:   This additional specifier is used if the individual is in an environment where access to alcohol is restricted.    Mild: Presence of 2-3 symptoms  Moderate: Presence of 4-5 symptoms  Severe: Presence of 6 or more symptoms    Collateral information: EVITA Collateral Info: Sufficient information is obtained from the patient to support diagnosis and recommendations. Contact with a collateral sources is not required.    Recommendations:      1.)  It was recommended  the patient abstain from Fentanyl powder and from all other non-prescribed mood altering chemicals.   2.)  Have a mental health evaluation to address his current clinical mental health issues while on a residential or board and lodging substance use disorder treatment program unit.  3.)  Follow all of the recommendations of his medical and mental health providers.  4.)  Continue taking his prescribed Suboxone as prescribed by his Medication Assisted Therapy providers.   5.)  Enter the [facility] program at [facility] in [place], [place] or enter a similar residential or board and lodging treatment program for substance use disorder treatment.  6.)  Follow all of the recommendations of his substance use disorder treatment providers including entering an extended care program as needed.        The patient reported she was willing to follow the above recommendations.      The patient meets criteria for the following levels of care based on ASAM Criteria:      Withdrawal Management - The patient would benefit from continuing to take Suboxone for Medication Assisted Therapy as prescribed by his Medication Assisted Therapy providers.    Treatment/Recovery Services - 3.5 Clinically Managed Medium and High Intensity Residential Services.      The patient's placement aligns with the assessment and placement level of care recommendation based on current ASAM Dimension scale ratings.    The patient would like the following family or other support people involved in their treatment:  None at this time.  The patient has a history of opioid abuse and was given treatment options, including Medication Assisted Treatment and information on the risks of opioid use disorder including recognizing and responding to opioid overdose.  The patient is currently receiving the following services: The patient is currently working with the [facility] for Medication Assisted Therapy with Suboxone.      Clinical  Substantiation for the above recommendations: The patient has been unable to maintain abstinence from THC/cannabis while living at his current home environment, would benefit from developing long-term sober maintenance skills, would benefit from developing sober coping skills, would benefit from developing a sober peer support network, has dual issues of mental health and substance abuse, and has mental health symptoms which are exacerbated by substance abuse.    Referral:   The [facility] treatment program at [facility] in [place], [place].    EVITA consult completed by: Brayden Brown [facility].  Phone Number: [phone]  E-mail Address: [email or web address] Evaluation Department  [address] [place] [address]    *Due to regulation of Title 42 of the Code of Federal Regulations (CFR) Part 2: Confidentiality laws apply to this note and the information wherein.  Thus, this note cannot be copy and pasted into any other health care staff's note nor can it be included in general medical records sent to ANY outside agency without the patient's written consent.

## 2025-05-12 ENCOUNTER — TRANSFERRED RECORDS (OUTPATIENT)
Dept: BEHAVIORAL HEALTH | Facility: CLINIC | Age: 20
End: 2025-05-12

## 2025-05-13 ENCOUNTER — TELEPHONE (OUTPATIENT)
Dept: BEHAVIORAL HEALTH | Facility: CLINIC | Age: 20
End: 2025-05-13

## 2025-05-13 ENCOUNTER — HOSPITAL ENCOUNTER (OUTPATIENT)
Dept: BEHAVIORAL HEALTH | Facility: CLINIC | Age: 20
Discharge: HOME | End: 2025-05-13
Attending: FAMILY MEDICINE
Payer: COMMERCIAL

## 2025-05-13 VITALS
WEIGHT: 130 LBS | RESPIRATION RATE: 18 BRPM | OXYGEN SATURATION: 97 % | TEMPERATURE: 98.1 F | DIASTOLIC BLOOD PRESSURE: 74 MMHG | SYSTOLIC BLOOD PRESSURE: 114 MMHG | HEART RATE: 98 BPM | BODY MASS INDEX: 22.2 KG/M2 | HEIGHT: 64 IN

## 2025-05-13 DIAGNOSIS — F17.200 TOBACCO USE DISORDER, MODERATE, DEPENDENCE: Primary | ICD-10-CM

## 2025-05-13 PROBLEM — F19.20 CHEMICAL DEPENDENCY (H): Status: ACTIVE | Noted: 2025-05-13

## 2025-05-13 LAB — CREAT UR-MCNC: 240 MG/DL

## 2025-05-13 PROCEDURE — H2035 A/D TX PROGRAM, PER HOUR: HCPCS | Mod: HQ

## 2025-05-13 PROCEDURE — 1002N00001 HC LODGING PLUS FACILITY CHARGE ADULT

## 2025-05-13 PROCEDURE — 80324 DRUG SCREEN AMPHETAMINES 1/2: CPT | Performed by: FAMILY MEDICINE

## 2025-05-13 PROCEDURE — H2035 A/D TX PROGRAM, PER HOUR: HCPCS

## 2025-05-13 PROCEDURE — H2035 A/D TX PROGRAM, PER HOUR: HCPCS | Mod: HQ | Performed by: COUNSELOR

## 2025-05-13 RX ORDER — ACETAMINOPHEN 500 MG
500-1000 TABLET ORAL EVERY 8 HOURS PRN
COMMUNITY

## 2025-05-13 RX ORDER — IBUPROFEN 200 MG
600 TABLET ORAL EVERY 6 HOURS PRN
COMMUNITY

## 2025-05-13 RX ORDER — GUAIFENESIN 200 MG/10ML
200 LIQUID ORAL EVERY 4 HOURS PRN
COMMUNITY

## 2025-05-13 RX ORDER — AMOXICILLIN 250 MG
2 CAPSULE ORAL DAILY PRN
COMMUNITY
End: 2025-05-14

## 2025-05-13 RX ORDER — LORATADINE 10 MG/1
10 TABLET ORAL DAILY PRN
COMMUNITY

## 2025-05-13 RX ORDER — MAGNESIUM HYDROXIDE/ALUMINUM HYDROXICE/SIMETHICONE 120; 1200; 1200 MG/30ML; MG/30ML; MG/30ML
30 SUSPENSION ORAL EVERY 6 HOURS PRN
COMMUNITY

## 2025-05-13 ASSESSMENT — ANXIETY QUESTIONNAIRES
5. BEING SO RESTLESS THAT IT IS HARD TO SIT STILL: NOT AT ALL
GAD7 TOTAL SCORE: 0
8. IF YOU CHECKED OFF ANY PROBLEMS, HOW DIFFICULT HAVE THESE MADE IT FOR YOU TO DO YOUR WORK, TAKE CARE OF THINGS AT HOME, OR GET ALONG WITH OTHER PEOPLE?: NOT DIFFICULT AT ALL
3. WORRYING TOO MUCH ABOUT DIFFERENT THINGS: NOT AT ALL
GAD7 TOTAL SCORE: 0
IF YOU CHECKED OFF ANY PROBLEMS ON THIS QUESTIONNAIRE, HOW DIFFICULT HAVE THESE PROBLEMS MADE IT FOR YOU TO DO YOUR WORK, TAKE CARE OF THINGS AT HOME, OR GET ALONG WITH OTHER PEOPLE: NOT DIFFICULT AT ALL
4. TROUBLE RELAXING: NOT AT ALL
6. BECOMING EASILY ANNOYED OR IRRITABLE: NOT AT ALL
2. NOT BEING ABLE TO STOP OR CONTROL WORRYING: NOT AT ALL
1. FEELING NERVOUS, ANXIOUS, OR ON EDGE: NOT AT ALL
7. FEELING AFRAID AS IF SOMETHING AWFUL MIGHT HAPPEN: NOT AT ALL
7. FEELING AFRAID AS IF SOMETHING AWFUL MIGHT HAPPEN: NOT AT ALL
GAD7 TOTAL SCORE: 0

## 2025-05-13 ASSESSMENT — COLUMBIA-SUICIDE SEVERITY RATING SCALE - C-SSRS
6. HAVE YOU EVER DONE ANYTHING, STARTED TO DO ANYTHING, OR PREPARED TO DO ANYTHING TO END YOUR LIFE?: NO
1. IN THE PAST MONTH, HAVE YOU WISHED YOU WERE DEAD OR WISHED YOU COULD GO TO SLEEP AND NOT WAKE UP?: NO
2. HAVE YOU ACTUALLY HAD ANY THOUGHTS OF KILLING YOURSELF IN THE PAST MONTH?: NO

## 2025-05-13 ASSESSMENT — PAIN SCALES - GENERAL: PAINLEVEL_OUTOF10: NO PAIN (0)

## 2025-05-13 ASSESSMENT — PATIENT HEALTH QUESTIONNAIRE - PHQ9
SUM OF ALL RESPONSES TO PHQ QUESTIONS 1-9: 0
SUM OF ALL RESPONSES TO PHQ QUESTIONS 1-9: 0
10. IF YOU CHECKED OFF ANY PROBLEMS, HOW DIFFICULT HAVE THESE PROBLEMS MADE IT FOR YOU TO DO YOUR WORK, TAKE CARE OF THINGS AT HOME, OR GET ALONG WITH OTHER PEOPLE: NOT DIFFICULT AT ALL

## 2025-05-13 NOTE — PROGRESS NOTES
Progress Note      DAANES ID:     This patient had a Full Comprehensive Substance Use Disorder assessment on 5/7/2025 completed by SATYA Najera, ANGIE.  This patient was seen for a face to face update of the Full Comprehensive Substance Use Disorder assessment on 5/13/2025 by ANGIE Echeverria.  INSIDE: The patient's Full Comprehensive Substance Use Disorder assessment completed on 5/7/2025 is in the patient's electronic medical record in Epic in the Chart Review section under the Notes/Trans Tab.    Alcohol/Drug use since the last CD evaluation (include date of last use):     Pt reports last use of cannabis was 5/10/25. Pt reports last use of Fentanyl was 3-4 months ago.  Pt reports last use of Methamphetamine was 5/7/2025, however his UA was positive for amphetamines and methamphetamine.      Please note any other clinical changes since the last CD evaluation (such as medication changes, additional legal charges, detoxification admissions, overdoses, etc.)     No significant changes since the last CD evaluation       ASAM Dimensions Original scores Current Scores   I.) Intoxication and Withdrawal: 1 1   II.) Biomedical:  0 0   III.) Emotional and Behavioral:  2 2   IV.) Readiness to Change:  3 3   V.) Relapse Potential: 4 4   VI.) Recovery Environmental: 3 3     Please list clinical justifications for the above ASAM score changes since the original comprehensive assessment:     None of the ASAM scores on the six dimensions had changed since the Full Comprehensive Substance Use Disorder assessment was completed on 5/7/2025.       Current RCISS: Current UA:     0.000     Positive for Amphetamines, Buprenorphine, Methamphetamine, and THC and negative for all other screened drugs.       PHQ-9, MERVAT-7   PHQ-9 on 5/13/2025 MERVAT-7 on 5/13/2025   The patient's PHQ-9 score was 0 out of 27, indicating no depression.   The patient's MERVAT-7 score was 0 out of 21, indicating minimal anxiety.       [place]-Suicide Severity  Rating Scale Reassessment   Have you ever wished you were dead or that you could go to sleep and not wake up?  Past Month:  No     Have you actually had any thoughts of killing yourself?  Past Month:  No     Have you been thinking about how you might do this?     Past Month:  No   Lifetime:  No   Have you had these thoughts and had some intention of acting on them?     Past Month:  No   Lifetime:  No   Have you started to work out the details of how to kill yourself?   Past Month:  No   Lifetime:  No   Do you intend to carry out this plan?   No     When you have the thoughts how long do they last?  The patient denied ever having any suicidal thoughts in life.     Are there things - anyone or anything (i.e. family, Zoroastrianism, pain of death) that stopped you from wanting to die or acting on thoughts of suicide?  Does not apply       2008  [employer]  Used with permission by Breana Guan, PhD.       Guide to C-SSRS Risk Ratings   NO IDEATION:  with no active thoughts IDEATION: with a wish to die. IDEATION: with active thoughts. Risk Ratings   If Yes No No 0 - Very Low Risk   If NA Yes No 1 - Low Risk   If NA Yes Yes 2 - Low/moderate risk   IDEATION: associated thoughts of methods without intent or plan INTENT: Intent to follow through on suicide PLAN: Plan to follow through on suicide Risk Ratings cont...   If Yes No No 3 - Moderate Risk   If Yes Yes No 4 - High Risk   If Yes Yes Yes 5 - High Risk   The patient's ADDITIONAL RISK FACTORS and lack of PROTECTIVE FACTORS may increase their overall suicide risk ratings.     Additional Risk Factors:   No additional risk factors   Protective Factors:   Having people in his/her life that would prevent the patient from considering a suicide attempt (i.e. young children, spouse, parents, etc.)    Having pet(s) that give companionship and/or would prevent the patient from considering a suicide attempt    An absence of mental health issues or  "stable and well treated mental health issues    An absence of chronic health problems or stable and well treated chronic health issues    A positive relationship with his/her clinical medical and/or mental health providers    Having easy access to supportive family members    Having a good community support network    Having restricted access to highly lethal means of suicide     Risk Status   0. - Very Low Risk:  Evaluation Counselors:  Document in Epic / SBAR to counselor \"Very Low Risk\".      Treatment Counselors:  Reassess upon admission as applicable, assess weekly in progress notes under Dimension 3 and summarize in Discharge / Treatment summary under Dimension 3.     Additional information to support suicide risk rating: There was no additional information to provide at this time.       Answers submitted by the patient for this visit:  Patient Health Questionnaire (Submitted on 5/13/2025)  If you checked off any problems, how difficult have these problems made it for you to do your work, take care of things at home, or get along with other people?: Not difficult at all  PHQ9 TOTAL SCORE: 0  Patient Health Questionnaire (G7) (Submitted on 5/13/2025)  MERVAT 7 TOTAL SCORE: 0    "

## 2025-05-13 NOTE — PROGRESS NOTES
Initial Services Plan    Before your first treatment group, please do the following    Immediate health & safety concerns: Look for sober housing and a supportive social network.  Look for a support network (such as AA, NA, DBT group, a Religion group, etc.)    Suggestions for client during the time between intake & completion of treatment plan:  Tour your treatment center (unit or outpatient clinic).  Introduce yourself to the treatment group.  Spend time getting to know your peers.  Complete the problem list for your treatment plan.  Start drug and alcohol use history.  Review your patient or client handbook.    Client issues to be addressed in the first treatment sessions:  Other  notification    Tyler Mcintyre, [facility]  5/13/2025

## 2025-05-13 NOTE — TELEPHONE ENCOUNTER
[facility] pt has history high risk sexual activity and requests testing for sexually transmitted disease.    Requesting testing.     [facility]  Nurse Liaison / CD Adult [facility] (LP)  [address]  O:[phone]  F:[phone]  RN [place] [id number]  LP After hours(UC):[phone]  LP admin counselor:[phone]  CD assess:[phone]

## 2025-05-13 NOTE — TELEPHONE ENCOUNTER
----- Message from Tyler Mcintyre sent at 5/13/2025  8:42 AM CDT -----  Regarding: schedule admission  Scheduling RequestPatient Name: Chacho HUSAIN BatresLocation of programming: Lodging PlusStart Date: May / 13 / 2025Group: SUGAR NAVARRETE on Monday at 9:00 AM Attending Provider (MD): VollmerDuration of Appointment in minutes: 840Number of Visits 28Visit Type: In-person or Treatment - 870Additional notes: direct admission

## 2025-05-13 NOTE — GROUP NOTE
Group Therapy Documentation    PATIENT'S NAME: Chacho Lucas  MRN:   [id number]  :   2005  ACCT. NUMBER: [id number]  DATE OF SERVICE: 25  START TIME: 12:30 PM  END TIME:  2:30 PM  FACILITATOR(S): Ashlee Bailey LADC  TOPIC: BEH Group Therapy  Number of patients attending the group:  6  Group Length:  2 Hours    Dimensions addressed: 3, 4, and 5    Summary of Group / Topics Discussed:    Recovery Principles, Sober coping skills, Relationship/socialization, and Relapse prevention    Group Attendance:  Attended group session    Patient's response to the group topic/interactions:  cooperative with task    Patient appeared to be Actively participating, Attentive, and Engaged.        Client specific details:  Patient attended group session and was attentive and participative..

## 2025-05-13 NOTE — PROGRESS NOTES
"[facility] Nursing Health Assessment      Vital signs:     /74   Pulse 98   Temp 98.1  F (36.7  C)   Resp 18   Ht 1.626 m (5' 4\")   Wt 59 kg (130 lb)   SpO2 97%   BMI 22.31 kg/m        Direct admission    Counselor: Group B   Drug of Choice: Fentanyl, Marijuana   Last use: Fentanyl: 3-4 months ago, Marijuana: 3 days ago   Home clinic/MD: No PCP, [facility]en in [facility] for Suboxone prescriptions   Psychiatrist/therapist: None     Medical history/current conditions:  Denies    H&P Screen:  H&P within the last 90 days: Yes.  Date: 4/29/25 Location: Office Visit       Mental Health diagnosis: Anxiety, ADHD, Depression    Medication compliant?: No current meds   Recent sucidal thoughts? None    When? N/A  Current thought of self-harm? None    Plan? None    Pain assessment:   Pt. Experiencing pain at this time?  No      LP Falls assessment    Has patient had a fall(s) in the last 6 months while they were NOT under the influence of ETOH or drugs?  No  If yes, what were the circumstances surrounding the fall(s)? N/A  Does patient have gait dysfunctions? (limping, dragging of toes, shuffling feet, unsteadiness, difficulty standing /walking)  No  Does patient appear to have deconditioning/muscle loss/malnutrition/fatigue? (due to inactivity, bedrest (long hospitalization), medical condition(s) No  Does patient experience confusion, dizziness or vertigo? No  Is patient visually impaired? No   Does patient have any adaptive equipment (hearing aids, wheelchair, walker, prosthesis, crutches, cane, etc..) No  Is patient taking 2 or more of the following medications?  anticonvulsants, anti-hypertensives, diuretics, laxatives, sedatives, and psychotropics (single-select) No  Is patient taking medication (s) that would cause urinary or bowel urgency (ex: lactulose/Furosemide)? No  Does patient have a medical condition (ex: Diabetes, Liver Disease, Respiratory Diseases, Chronic Pain, Heart Disease, Neuropathy, Seizure " like Disorder, etc...) that could affect balance/gait or risk for falls? No    If yes to any of the above educate patient on fall prevention while at [facility].           Floors clutter/obstacle free           Proper footwear/[place] shoes          Encourage the use of appropriate lighting.            Adjust walking speed accordingly          Recommend caution going on longer walks. Try shorter walks and see how you do first.  Use elevators when appropriate.          Notify staff if you are experiencing fatigue, weakness, drowsiness/ dizziness or have had a fall.           Use adaptive equipment as recommended          Wheelchairs must be managed and propelled independently without staff assistance           Expectation of complete independence with all cares and compliance.  Report to staff if having difficulty     ____________________________________________________________________________________________________________________________  RN Assessment of Infectious Disease concerns:    Infectious disease concerns None      _____________________________________________________________________________________________________________________________     Community Medical Screen for COVID-19    Do you have any of the following NEW or worsening symptoms NOT attributed to pre-existing conditions?    No    Fever of 100.0  F (37.8 C) or over  Chills  Cough  Shortness of Breath  Loss of taste or smell  Generalized body aches  Persistent headache  Sore throat (or trouble eating or drinking in young children?)  Nausea, Vomiting, or diarrhea (loose stools)    If pt responds YES to any of the symptoms / Positive COVID-19  without symptoms pt will need to leave unit immediately and can return in 6 days from discharge date.      Did you test positive for COVID-19 in the last 10 days or are you waiting on the test results due to an exposure or symptoms?  No    Has anyone told you to self-quarantine due to exposure to someone  with COVID-19?  No    COVID - 9 positive patients must quarantine for five days.  They can return to in-person therapy on day 6 following Duration of Special Precautions for COVID-19.      COVID-19 Test completed by LPRN ? No    COVID-19 - Pt informed of the following while at LP:    1) If pt has any of the symptoms below, notify staff immediately.    Fever   Cough   Shortness of breath or difficulty breathing   Chills   Repeated shaking with chills  Muscle pain   ____________________________________________________________________________________________________________________________        Integrative Therapies: Essential Oils    Patient requesting essential oil inhaler to manage (Mood/Mental Health/Physical/Spiritual symptoms).     Discussed appropriate use of essential oil inhalers and instructed patient not to leave labeled product out on unit.     Patient was screened for kidney disease, asthma/reactive airway disease and rashes and wounds or 1st trimester of pregnancy    List Essential Oils requested by pt Calm   LIMIT ONE ESSENTIAL OIL PER PT    Patient verbalized and demonstrated understanding of how to use essential oil inhaler correctly and will notify LP RN with any concerns or side effects. Patient agrees not to share their essential oil inhaler with other clients.  Continue to support the patient in safely utilizing integrative therapies as able to manage symptoms during treatment.     Patient tobacco use:    Do you use tobacco? Yes   Type? Cigarettes   How often? Occasionally   How much? Few    Are you interested in quitting? no    NRT (Nicotine Replacement therapy) ordered? Yes   Pt is aware of the dangers of tobacco cessation and in contemplation.    Pt given written education.    Nutritional Assessment:    Have you ever purged, binged or restricted yourself as a way to control your weight?   No     Are you on a special diet?   No     Do you have any concerns regarding your nutritional status?    No     Have you had any appetite changes in the last 3 months?   No   Have you had weight loss or weight gain of more than 10 lbs in the last 3 months?   If patient gained or lost more than 10 lbs, then refer to program RN / attending Physician for assessment.   No   Was the patient informed of BMI?    Normal, No Intervention   Yes   Have you engaged in any risk-taking behavior that would put you at risk for exposure to blood-borne or sexually transmitted diseases?   Yes, explain: requesting STD testing   Do you have any dental problems?   No       [facility] nurse Medical Screening Tool for patients with Opioid Use Disorder    Pt informed they must admit to [facility] with Buprenorphine present in their urine OR come directly from a community detox.  If the patient cannot achieve this, they are encouraged to enter detox prior to admitting to [facility]. Does pt verbalize understanding? Yes     Pt reporting last use of opioid on date 3 months ago   What are the patient plans to manage withdrawal? N/A   Do pt's have community provider to help manage withdrawal symptoms? Yes   Does the pt have possession of Suboxone and/or comfort medications? Yes   Is patient currently using Methadone? (**Please inform patient that they cannot use Methadone while at [facility] and that appropriate detox must be initiated by a professional and/or detox facility prior to admission approval.) No    Refer pt to walk-in Recovery Clinic? Yes  LPRN to review with pt:   Pt is expected to attend all required LP programming without staff prompting   Compliancy with all prescribed medication self-administration is required   Pt understands LP drug toxicology screening process Yes     LPRN reviewed with pt the following information:  Yes  Pt informed if leaving AMA, they will be directed to take medications with them.  Should pt's choose to leave medications at LP, ALL medications will be packaged and delivered to inpatient pharmacy for  temporary storage.  Inpt pharmacy will follow protocol to reach out to pt.  If pharmacy unable to reach pt and/or pt does not retrieve medications, they will be destroyed per inpt pharmacy protocol.   In regards to 'medical concerns/medication refill expectations' while at LP:  Pt understands LP has no rounding/managing provider to assess medical issues or to refill medications.  Pt's instructed to make virtual/phone appt/s with community provider/s and notify LPRN of date and time  Pt's understand they may not leave LP to attend any medical appt's.   Pt's understand they are responsible for having a plan to refill medications and to allow time to troubleshoot.      Pt verbalizes understanding of the above criteria Yes     [facility]  Nurse Liaison / CD Adult [facility]  O: [phone]  Fax:[phone]  LPRN [place] [id number]  M-F: 7AM to 5:30PM   Sat-Sun: 7AM to 3PM  After hours: [phone]       Nursing Assessment Summary:  See above     On-going nursing intervention required?   No    Acute care visit recommended: no
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
## 2025-05-13 NOTE — GROUP NOTE
Psychoeducation Group Documentation    PATIENT'S NAME: Chacho Lucas  MRN:   [id number]  :   2005  ACCT. NUMBER: [id number]  DATE OF SERVICE: 25  START TIME:  3:00 PM  END TIME:  4:00 PM  FACILITATOR(S): Eliana Lyons LADC; Capri Robison LADC; Ashlee Bailey LADC  TOPIC: BEH Pyschoeducation  Number of patients attending the group:  21  Group Length:  1 Hours    Skills Group Therapy Type: Recovery skills    Summary of Group / Topics Discussed:    Symptom management skills    Patients attended a lecture regarding the role of gender as it relates to substance use disorders.          Group Attendance:  Attended group session    Patient's response to the group topic/interactions:  listened actively    Patient appeared to be Attentive.         Client specific details:  Patient appeared to listen attentively to this lecture..

## 2025-05-14 ENCOUNTER — OFFICE VISIT (OUTPATIENT)
Dept: BEHAVIORAL HEALTH | Facility: CLINIC | Age: 20
End: 2025-05-14
Payer: COMMERCIAL

## 2025-05-14 ENCOUNTER — LAB (OUTPATIENT)
Dept: LAB | Facility: CLINIC | Age: 20
End: 2025-05-14
Payer: COMMERCIAL

## 2025-05-14 ENCOUNTER — HOSPITAL ENCOUNTER (OUTPATIENT)
Dept: BEHAVIORAL HEALTH | Facility: CLINIC | Age: 20
Discharge: HOME | End: 2025-05-14
Attending: FAMILY MEDICINE
Payer: COMMERCIAL

## 2025-05-14 VITALS — HEART RATE: 72 BPM | SYSTOLIC BLOOD PRESSURE: 106 MMHG | DIASTOLIC BLOOD PRESSURE: 69 MMHG

## 2025-05-14 DIAGNOSIS — K59.03 OPIOID-INDUCED CONSTIPATION: ICD-10-CM

## 2025-05-14 DIAGNOSIS — T40.2X5A OPIOID-INDUCED CONSTIPATION: ICD-10-CM

## 2025-05-14 DIAGNOSIS — G47.00 INSOMNIA, UNSPECIFIED TYPE: ICD-10-CM

## 2025-05-14 DIAGNOSIS — F17.200 NICOTINE USE DISORDER: ICD-10-CM

## 2025-05-14 DIAGNOSIS — F12.20 CANNABIS USE DISORDER, MODERATE, DEPENDENCE (H): ICD-10-CM

## 2025-05-14 DIAGNOSIS — Z51.81 ENCOUNTER FOR MONITORING OPIOID MAINTENANCE THERAPY: ICD-10-CM

## 2025-05-14 DIAGNOSIS — Z72.51 HISTORY OF UNPROTECTED SEX: ICD-10-CM

## 2025-05-14 DIAGNOSIS — R11.0 NAUSEA: ICD-10-CM

## 2025-05-14 DIAGNOSIS — F11.20 OPIOID USE DISORDER, SEVERE, DEPENDENCE (H): Primary | ICD-10-CM

## 2025-05-14 DIAGNOSIS — Z79.891 ENCOUNTER FOR MONITORING OPIOID MAINTENANCE THERAPY: ICD-10-CM

## 2025-05-14 LAB
AMPHETAMINE QUAL URINE POCT: NEGATIVE
BARBITURATE QUAL URINE POCT: NEGATIVE
BENZODIAZEPINE QUAL URINE POCT: NEGATIVE
BUPRENORPHINE QUAL URINE POCT: ABNORMAL
COCAINE QUAL URINE POCT: NEGATIVE
CREATININE QUAL URINE POCT: ABNORMAL
FENTANYL UR QL: ABNORMAL
HCV AB SERPL QL IA: NONREACTIVE
HIV 1+2 AB+HIV1 P24 AG SERPL QL IA: NONREACTIVE
INTERNAL QC QUAL URINE POCT: ABNORMAL
MDMA QUAL URINE POCT: NEGATIVE
METHADONE QUAL URINE POCT: NEGATIVE
METHAMPHETAMINE QUAL URINE POCT: NEGATIVE
OPIATE QUAL URINE POCT: NEGATIVE
OXYCODONE QUAL URINE POCT: NEGATIVE
PH QUAL URINE POCT: ABNORMAL
PHENCYCLIDINE QUAL URINE POCT: NEGATIVE
POCT KIT EXPIRATION DATE: ABNORMAL
POCT KIT LOT NUMBER: ABNORMAL
SPECIFIC GRAVITY POCT: 1.02
TEMPERATURE URINE POCT: ABNORMAL
THC QUAL URINE POCT: ABNORMAL

## 2025-05-14 PROCEDURE — 80354 DRUG SCREENING FENTANYL: CPT | Performed by: FAMILY MEDICINE

## 2025-05-14 PROCEDURE — 1002N00001 HC LODGING PLUS FACILITY CHARGE ADULT

## 2025-05-14 PROCEDURE — 86803 HEPATITIS C AB TEST: CPT

## 2025-05-14 PROCEDURE — 86780 TREPONEMA PALLIDUM: CPT

## 2025-05-14 PROCEDURE — 80307 DRUG TEST PRSMV CHEM ANLYZR: CPT | Performed by: FAMILY MEDICINE

## 2025-05-14 PROCEDURE — H2035 A/D TX PROGRAM, PER HOUR: HCPCS | Mod: HQ

## 2025-05-14 PROCEDURE — 86592 SYPHILIS TEST NON-TREP QUAL: CPT

## 2025-05-14 PROCEDURE — 87389 HIV-1 AG W/HIV-1&-2 AB AG IA: CPT

## 2025-05-14 PROCEDURE — 86593 SYPHILIS TEST NON-TREP QUANT: CPT

## 2025-05-14 PROCEDURE — 36415 COLL VENOUS BLD VENIPUNCTURE: CPT

## 2025-05-14 RX ORDER — ONDANSETRON 4 MG/1
4 TABLET, FILM COATED ORAL EVERY 8 HOURS PRN
Qty: 12 TABLET | Refills: 0 | Status: SHIPPED | OUTPATIENT
Start: 2025-05-14

## 2025-05-14 RX ORDER — AMOXICILLIN 250 MG
2 CAPSULE ORAL DAILY PRN
Qty: 60 TABLET | Refills: 1 | Status: SHIPPED | OUTPATIENT
Start: 2025-05-14

## 2025-05-14 ASSESSMENT — PATIENT HEALTH QUESTIONNAIRE - PHQ9: SUM OF ALL RESPONSES TO PHQ QUESTIONS 1-9: 0

## 2025-05-14 NOTE — NURSING NOTE
[facility]      Rooming information:  Pt states started at  yesterday 5/13/24 and was told to com to  today at 12:30p for check in. Pt last visit 4/29/25  Approximate last use of full opioid agonist: 3-4 months ago  Taking buprenorphine? Yes: suboxone  How much per day? 6mg  Number of buprenorphine films/tablets remaining currently: some left  Side effects related to buprenorphine (constipation, dry mouth, sedation?) No   Narcan currently available: Yes  Other recent substance use:    Cannabis   NICOTINE-Yes: cigs & vape  If using nicotine, ready to quit? No but has nicotine gum    Point of care urine drug screen positive for:  Lab Results   Component Value Date    BUP Screen Positive (A) 05/14/2025    BZO Negative 05/14/2025    BAR Negative 05/14/2025    CASIE Negative 05/14/2025    MAMP Negative 05/14/2025    AMP Negative 05/14/2025    MDMA Negative 05/14/2025    MTD Negative 05/14/2025    [id number] Negative 05/14/2025    OXY Negative 05/14/2025    PCP Negative 05/14/2025    THC Screen Positive (A) 05/14/2025    TEMP 94 F 05/14/2025    SGPOCT 1.025 05/14/2025       *POC urine drug screen does not screen for Fentanyl        Depression Response    Patient completed the PHQ-9 assessment for depression and scored >9? No  Question 9 on the PHQ-9 was positive for suicidality? No  Does patient have current mental health provider? No  C-SSRS screener risk level.       Is this a virtual visit? No    I personally notified the following: NA          5/14/2025     1:00 PM   PHQ Assesment Total Score(s)   PHQ-9 Score 0         Housing needs: LP 5/13/25    Insurance: active- [employer] p[employer]    Current legal issues: yes    Contact information up to date? yes    3rd Party Involvement no today (please obtain MARYCARMEN if pt would like to include)    Jen Manuel MA  May 14, 2025  12:57 PM

## 2025-05-14 NOTE — PROGRESS NOTES
"Comprehensive Assessment Summary     Based on client interview, review of previous assessments and   comprehensive assessment interview the following diagnosis and recommendations are:     Patient: Chacho Lucas  MRN; [id number]   : 2005  Age: 19 year old Sex: male       Client meets criteria for:  Opioid Use Disorder, Severe (F11.20)  Cannabis Use Disorder, Severe (F12.20)  Tobacco Use Disorder, Moderate (F17.20)    Dimension One: Acute Intoxication/Withdrawal Potential     Ratin     Patient reported Cannabis as his primary substance of use. His last reported use of Cannabis was on 5/10/2025. Patient is currently prescribed Suboxone for withdrawal symptom management. He reported last use of Fentanyl was on 2025. Patient admitted directly from the community to [facility].      Dimension Two: Biomedical Condition and Complications    Ratin  Patient denied any current medical concerns. He reported having an allergy to peanuts/ peanut products. He reported currently not having a primary care physician in the community. Patient appears able to seek medical services as necessary.     Dimension Three: Emotional/Behavioral/Cognitive Conditions & Complications    Ratin   Patient reported a history of Anxiety, Depression, and ADHD. Upon admission patient's PHQ-9 score was 0/27, indicating no depression. His MERVAT-7 score was 0/21, indicating minimal anxiety. He was rated as a very low risk for suicide upon admission. Patient denied having any significant issues with grief/loss. Patient requested to work with a therapist while in programming.     Dimension Four: Treatment Acceptance/Resistance     Rating: 3   Patient stated, \"better future,\" as is motivation to seek treatment. Patient acknowledged that he has a problem with substance nelly. He rated his motivation to seek treatment as a 10/10. He reported that his family encouraged him to seek treatment.     Dimension Five: Continued Use/Relaspe " Prevention     Ratin   Patient reported 1 prior treatment episode (). He reported his longest period of sobriety as 3-4 months. He reported that life stress contributed to his recent return to use. He also identified being around others who are using as a significant trigger to use.     Dimension Six: Recovery Environment     Rating: 3    Patient reported being unemployed and living with his mother prior to admission. He is uncertain about his plans for after treatment. He reported having no past sobriety support group attendance. He has not yet finished High School/GED. Patient reported that he does not know if he has any current legal involvement.       I have reviewed the information on the assessment, psychosocial and medical history and checklist:

## 2025-05-14 NOTE — GROUP NOTE
Group Therapy Documentation    PATIENT'S NAME: Chacho Lucas  MRN:   [id number]  :   2005  ACCT. NUMBER: [id number]  DATE OF SERVICE: 25  START TIME: 12:30 PM  END TIME:  2:30 PM  FACILITATOR(S): Wendy Darby; Jed Upton LADC  TOPIC: BEH Group Therapy  Number of patients attending the group:  6  Group Length:  2 Hours    Dimensions addressed: 4, 5, and 6    Summary of Group / Topics Discussed:    Spiritual Care    Group was facilitated by spiritual services staff regarding the importance of spiritual health in recovery. Participant provided feedback throughout group session.     Group Attendance:  Attended group session    Patient's response to the group topic/interactions:  cooperative with task    Patient appeared to be Actively participating.        Client specific details:  Patient was excused from the first half of group to attend an appointment. Patient actively engaged in group discussion.

## 2025-05-14 NOTE — PROGRESS NOTES
[facility] Follow Up    ASSESSMENT/PLAN                                                      1. Opioid use disorder, severe, dependence (H) (Primary)  Reporting last use remains 2/11/25. Patient resumed residential programming at  yesterday and presents for follow up. At his last visit, he reported taking 3-6 mg of suboxone intermittently to control symptoms, and was encouraged to increase suboxone back to 12 mg /day. He reports taking 6 mg daily but did take 12 mg today as prescribed while at . He is reluctant to take suboxone due to dental concerns. Discussed sublocade and patient is not interested at this time.   Continue suboxone 12 mg daily.   Reviewed oral hygiene with buprenorphine.  Continue programming at   Discussion about avoiding social and environmental triggers.   Confirms narcan access    2. Encounter for monitoring opioid maintenance therapy  - Drugs of Abuse Screen Urine (POC CUPS) POCT    3. Insomnia, unspecified type  Melatonin previously effective while in treatment, providing refill.   - melatonin 5 MG tablet; Take 1 tablet (5 mg) by mouth nightly as needed for sleep.  Dispense: 30 tablet; Refill: 0    4. Nausea  Reporting intermittent nausea for last 2 weeks, requesting medication to take as needed. Denies other symptoms of opiate withdrawal, does endorse constipation.   - ondansetron (ZOFRAN) 4 MG tablet; Take 1 tablet (4 mg) by mouth every 8 hours as needed for nausea.  Dispense: 12 tablet; Refill: 0    5. Opioid-induced constipation  Providing refill of senna to manage symptoms as needed.   - senna-docusate (SENOKOT-S/PERICOLACE) 8.6-50 MG tablet; Take 2 tablets by mouth daily as needed for constipation.  Dispense: 60 tablet; Refill: 1    6. Cannabis use disorder, moderate, dependence (H)  Using THC daily prior to starting LP. Monitor with withdrawal.     7. Nicotine use disorder  Has NRT available while at .              Return in about 2 weeks (around  5/28/2025) for Follow up, in person.      Patient counseling completed today:  Discussed mechanism of action, potential risks/benefits/side effects of medications and other recommendations above.     Discussed risk of precipitated withdrawal with initiation of buprenorphine in the presence of full opioid agonists.    Reviewed directions for initiation of buprenorphine to reduce risk of precipitated withdrawal and maximize efficacy.    Harm reduction counseling including never use alone, availability of naloxone, risks associated with concurrent use of opioids and benzodiazepines, alcohol, or other sedatives, safer administration as applicable.  Discussed importance of avoiding isolation, building a network of supportive relationships, avoiding people/places/things associated with past use to reduce risk of relapse; including motivational interviewing regarding psychosocial interventions.   SUBJECTIVE                                                          CC/HPI:  Chacho Lucas is a 19 year old male with PMH of opioid use disorder  who presents to the Recovery Clinic for return visit.      First Recovery Clinic visit: 3/8/22  Last date of illicit opioid use: 2/11/25     Brief history of use:   Started using illicitly manufactured fentanyl tablets at age 15. Started with 1 pill per day. Taking 15 pills per day at first [facility] visit.  Prior to pills he was using cannabis and occasional xanax. Continues to smoke cannabis daily. He has tried to quit using pills twice, when he stopped he was taking Suboxone off the street. First RC visit on 3/8/22, start on 8 mg TDD - up to 12 mg if needed. He was lost follow-up, returning to clinic on 7/25/22.  Has had continued inconsistent adherence to buprenorphine rx and ongoing use of fentanyl.  Started outpatient programming with [facility] 10/20/22.     Lost to follow up 10/21/2022, RTC 2/18/2025 and restarted on buprenorphine  Discharged from  3/11 due to behavioral issues (not  "attending group)  Evaluated at [facility] Addiction [facility] clinic 3/5 with new diagnosis of PTSD.     05/14/25 HPI:  Completed [facility]aylor eval and started residential programming at  yesterday. Patient was in  in March but was discharged early to sleeping in group. Needs to complete programming for probation.   Prior to coming to , was taking 6 mg of suboxone daily. Has been taking 12 mg since yesterday.   Has #19 films left at .    Reporting intermittent nausea for the last 2 weeks. Experiencing some constipations. Denies withdrawal symptoms.   Mood is \"good\"  Sleeping well. Will let staff know if he has trouble sleeping whil ein treatment.       Recommendations last visit: 4/29/25  1. Opioid use disorder, severe, dependence (H) (Primary)  Last use reported to be 2/11/25, although mother worried that pt has had intermittent use since discharge from [facility] 3/11. He has continued suboxone, taking 3-6 mg intermittently (~5/7 days per week). Interested in tapering and worried about potential dental complications.  Recommended pt continue for a minimum of 1 yr given risk for return to use with MOUD discontinuation and risk for subsequent death via overdose. Open to continuing at this time and agreed to increase dose back to 12 mg. Was open to discussion of sublocade, but decided against initiating at this time.  - Continue buprenorphine, increase back to 12 mg daily, encouraged daily use.   - Discussed rinsing mouth following SL buprenorphine dose, recommended follow up with dentist and orthodontist   - Confirms narcan access, mother aware of location  - Adult Mental Health  Referral; Future - Referral placed for new taylor eval, message sent to [facility] staff to re-evaluate pt for potential readmission, scheduled 5/7 3:00 pm     2. Encounter for monitoring opioid maintenance therapy  - Drugs of Abuse Screen Urine (POC CUPS) POCT; Standing  - Drug Confirmation Panel Urine with Creat - lab collect; " Future  - Drugs of Abuse Screen Urine (POC CUPS) POCT  - Drug Confirmation Panel Urine with Creat - lab collect     3. Cannabis Use Disorder   Likely has mild - moderate use disorder at this time, previously diagnosed as severe. Not interested in cessation at this time. Discussed potential for withdrawal syndrome if he needs to discontinue during treatment.   - Offered gabapentin to assist with this, declines.   - EVITA eval      4. Nicotine use disorder  Regular nicotine vape usage at this time. Pre-contemplative regarding cessation or reduction in use. Likely would benefit from NRT should he enter a residential program. Declines at this time.      4. PTSD  Started on clonidine and prazosin during lodging plus admission, reports not taking these medications regularly and has discontinued them at this time. No longer having issues with sleep. Denies flashbacks, nightmares, or other PTSD symptoms at this time.      5. ADHD   ADHD diagnosed in childhood and had IEP. Occasionally has issues with concentration but he had side effects with stimulants in the past. Overall goal is to avoid medications, but discussed that there are non-stimulant options for ADHD treatment should he reconsider.     Substance Use History:   Opioids:   Age at first use: 15  Current use: timing of last use: 2/11/25; substance: percs; quantity 15 pills daily; route: smoke and snort                 IV drug use: No   History of overdose: No  Previous treatments : No  Longest period of sobriety: 2 weeks  Medical complications related to substance use: denies  Hepatitis C: nonreactive on 3/8/22  HIV: nonreactive on 3/8/22     Other Addiction History:  Stimulants:   Past use: denies  Use in last 6 months: denies  Sedatives/hypnotics/anxiolytics:   Past use: [id number] use Xanax once per month   Use in last 6 months: denies  Alcohol:   Past use: drinking 4 beers, 3x per month. Last drink 4/23/25  Use in last 6 months: denies  Nicotine:   Cigarettes:  occasional use. Will be smoking while in LP   Vaping: daily  Chewing tobacco: denies  Cannabis:   Past use: daily use, smoking, last use 5/11/25  Hallucinogens:   Past use: denies  Use in last 6 months: denies  Behavioral addictions:   Denies      PAST PSYCHIATRIC HISTORY:  Diagnoses- ADHD, PTDS  Suicide Attempts: No   Hospitalizations: No        5/7/2025     2:57 PM 5/13/2025    10:11 AM 5/14/2025     1:00 PM   PHQ   PHQ-9 Total Score 0  0  0   Q9: Thoughts of better off dead/self-harm past 2 weeks Not at all Not at all Not at all       Patient-reported       Social History  Housing status: with grandparents, mother, siblings and uncle  Employment status: Unemployed, not seeking work  Relationship status: Single  Children: no children  Legal: on probation  Insurance needs: active         Labs discussed with patient?  Yes      [place] Prescription Drug Monitoring Program Reviewed:  Yes  03/05/2025 03/05/2025 1 Suboxone 12 Mg-3 Mg Sl Film 30.00 30  [place] [id number] [place] (5258) 0/0 12.00 mg Medicaid [place]   02/19/2025 02/18/2025 1 Suboxone 12 Mg-3 Mg Sl Film 30.00 15  [place] [id number]            Medications:  Current Outpatient Medications   Medication Sig Dispense Refill    melatonin 5 MG tablet Take 1 tablet (5 mg) by mouth nightly as needed for sleep. 30 tablet 0    ondansetron (ZOFRAN) 4 MG tablet Take 1 tablet (4 mg) by mouth every 8 hours as needed for nausea. 12 tablet 0    senna-docusate (SENOKOT-S/PERICOLACE) 8.6-50 MG tablet Take 2 tablets by mouth daily as needed for constipation. 60 tablet 1    acetaminophen (TYLENOL) 500 MG tablet Take 500-1,000 mg by mouth every 8 hours as needed for mild pain.      alum & mag hydroxide-simethicone (MAALOX) 200-200-20 MG/5ML SUSP suspension Take 30 mLs by mouth every 6 hours as needed for indigestion.      benzocaine-menthol (CHLORASEPTIC) 6-10 MG lozenge Place 1 lozenge inside cheek every 2 hours as needed for sore throat.      Buprenorphine HCl-Naloxone HCl (SUBOXONE) 12-3 MG  FILM per film Place 1 Film under the tongue daily. 8 Film 0    guaiFENesin (ROBITUSSIN) 20 mg/mL liquid Take 200 mg by mouth every 4 hours as needed for cough.      ibuprofen (ADVIL/MOTRIN) 200 MG tablet Take 600 mg by mouth every 6 hours as needed for pain.      loratadine (CLARITIN) 10 MG tablet Take 10 mg by mouth daily as needed for allergies.      naloxone (NARCAN) 4 MG/0.1ML nasal spray Spray 4 mg into one nostril alternating nostrils as needed for opioid reversal. every 2-3 minutes until assistance arrives      naloxone (NARCAN) 4 MG/0.1ML nasal spray Spray 1 spray (4 mg) into one nostril alternating nostrils once as needed for opioid reversal. every 2-3 minutes until assistance arrives 0.2 mL 11    nicotine polacrilex (NICORETTE) 4 MG gum 4 mg, buccal, every hour as needed for nicotine withdrawal symptoms. Chew until tingling, then place between cheek and gum. Repeat. Do not swallow. Not to exceed 96 mg (24 pieces) in a 24 hour period 200 each 1     No current facility-administered medications for this visit.     Facility-Administered Medications Ordered in Other Visits   Medication Dose Route Frequency Provider Last Rate Last Admin    Self Administer Medications: Behavioral Services   Does not apply See Admin Instructions Carly Hernandez MD           Allergies   Allergen Reactions    Peanut-Containing Drug Products Diarrhea     Denies true allergy       PMH, PSH, FamHx reviewed      OBJECTIVE                                                      /69   Pulse 72     Physical Exam  Cardiovascular:      Rate and Rhythm: Normal rate.   Pulmonary:      Effort: Pulmonary effort is normal. No respiratory distress.   Neurological:      General: No focal deficit present.      Mental Status: He is alert and oriented to person, place, and time.   Psychiatric:         Attention and Perception: Attention normal.         Mood and Affect: Mood normal.         Speech: Speech normal.         Behavior: Behavior is  cooperative.         Thought Content: Thought content normal. Thought content does not include suicidal ideation.         Judgment: Judgment normal.         Labs:    UDS:    Lab Results   Component Value Date    BUP Screen Positive (A) 05/14/2025    BZO Negative 05/14/2025    BAR Negative 05/14/2025    CASIE Negative 05/14/2025    MAMP Negative 05/14/2025    AMP Negative 05/14/2025    MDMA Negative 05/14/2025    MTD Negative 05/14/2025    [id number] Negative 05/14/2025    OXY Negative 05/14/2025    PCP Negative 05/14/2025    THC Screen Positive (A) 05/14/2025    TEMP 94 F 05/14/2025    SGPOCT 1.025 05/14/2025     *POC urine drug screen does not screen for Fentanyl      Recent Results (from the past 240 hours)   Urine Creatinine for Drug Screen Panel    Collection Time: 05/13/25  9:18 AM   Result Value Ref Range    Creatinine Urine for Drug Screen 240 mg/dL   Drugs of Abuse Screen Urine (POC CUPS) POCT    Collection Time: 05/14/25  1:09 PM   Result Value Ref Range    POCT Kit Lot Number [id number]     POCT Kit Expiration Date 1486519     Temperature Urine POCT 94 F 90 F, 92 F, 94 F, 96 F, 98 F, 100 F    Specific [place] POCT 1.025 1.005, 1.015, 1.025    pH Qual Urine POCT 5 pH 4 pH, 5 pH, 7 pH, 9 pH    Creatinine Qual Urine POCT 50 mg/dL 20 mg/dL, 50 mg/dL, 100 mg/dL, 200 mg/dL    Internal QC Qual Urine POCT Valid Valid    Amphetamine Qual Urine POCT Negative Negative    Barbiturate Qual Urine POCT Negative Negative    Buprenorphine Qual Urine POCT Screen Positive (A) Negative    Benzodiazepine Qual Urine POCT Negative Negative    Cocaine Qual Urine POCT Negative Negative    Methamphetamine Qual Urine POCT Negative Negative    MDMA Qual Urine POCT Negative Negative    Methadone Qual Urine POCT Negative Negative    Opiate Qual Urine POCT Negative Negative    Oxycodone Qual Urine POCT Negative Negative    Phencyclidine Qual Urine POCT Negative Negative    THC Qual Urine POCT Screen Positive (A) Negative                 Continued Complex Management  The longitudinal plan of care for Opioid Use Disorder (OUD) was addressed during this visit. Due to the added complexity in care, I will continue to support Wei in the subsequent management and with ongoing continuity of care.    Nadeen Luis, [place] [id number] [address] [address]  [phone]

## 2025-05-14 NOTE — PROGRESS NOTES
Progress Note      DAANES ID: [id number]    This patient had a Full Comprehensive Substance Use Disorder assessment on 5/7/2025 completed by SATYA aNjera, [facility].  This patient was seen for a face to face update of the Full Comprehensive Substance Use Disorder assessment on 5/13/2025 by MARY Echeverria.  INSIDE: The patient's Full Comprehensive Substance Use Disorder assessment completed on 5/7/2025 is in the patient's electronic medical record in Epic in the Chart Review section under the Notes/Trans Tab.    Alcohol/Drug use since the last CD evaluation (include date of last use):     Pt reports last use of cannabis was 5/10/25. Pt reports last use of Fentanyl was 3-4 months ago.  Pt reports last use of Methamphetamine was 5/7/2025, however his UA was positive for amphetamines and methamphetamine.      Please note any other clinical changes since the last CD evaluation (such as medication changes, additional legal charges, detoxification admissions, overdoses, etc.)     No significant changes since the last CD evaluation       ASAM Dimensions Original scores Current Scores   I.) Intoxication and Withdrawal: 1 1   II.) Biomedical:  0 0   III.) Emotional and Behavioral:  2 2   IV.) Readiness to Change:  3 3   V.) Relapse Potential: 4 4   VI.) Recovery Environmental: 3 3     Please list clinical justifications for the above ASAM score changes since the original comprehensive assessment:     None of the ASAM scores on the six dimensions had changed since the Full Comprehensive Substance Use Disorder assessment was completed on 5/7/2025.       Current CRISS: Current UA:     0.000     Positive for Amphetamines, Buprenorphine, Methamphetamine, and THC and negative for all other screened drugs.       PHQ-9, MERVAT-7   PHQ-9 on 5/13/2025 MERVAT-7 on 5/13/2025   The patient's PHQ-9 score was 0 out of 27, indicating no depression.   The patient's MERVAT-7 score was 0 out of 21, indicating minimal anxiety.       [place]-Suicide  Severity Rating Scale Reassessment   Have you ever wished you were dead or that you could go to sleep and not wake up?  Past Month:  No     Have you actually had any thoughts of killing yourself?  Past Month:  No     Have you been thinking about how you might do this?     Past Month:  No   Lifetime:  No   Have you had these thoughts and had some intention of acting on them?     Past Month:  No   Lifetime:  No   Have you started to work out the details of how to kill yourself?   Past Month:  No   Lifetime:  No   Do you intend to carry out this plan?   No     When you have the thoughts how long do they last?  The patient denied ever having any suicidal thoughts in life.     Are there things - anyone or anything (i.e. family, Denominational, pain of death) that stopped you from wanting to die or acting on thoughts of suicide?  Does not apply       2008  [employer]  Used with permission by Breana Guan, PhD.       Guide to C-SSRS Risk Ratings   NO IDEATION:  with no active thoughts IDEATION: with a wish to die. IDEATION: with active thoughts. Risk Ratings   If Yes No No 0 - Very Low Risk   If NA Yes No 1 - Low Risk   If NA Yes Yes 2 - Low/moderate risk   IDEATION: associated thoughts of methods without intent or plan INTENT: Intent to follow through on suicide PLAN: Plan to follow through on suicide Risk Ratings cont...   If Yes No No 3 - Moderate Risk   If Yes Yes No 4 - High Risk   If Yes Yes Yes 5 - High Risk   The patient's ADDITIONAL RISK FACTORS and lack of PROTECTIVE FACTORS may increase their overall suicide risk ratings.     Additional Risk Factors:   No additional risk factors   Protective Factors:   Having people in his/her life that would prevent the patient from considering a suicide attempt (i.e. young children, spouse, parents, etc.)    Having pet(s) that give companionship and/or would prevent the patient from considering a suicide attempt    An absence of mental health  "issues or stable and well treated mental health issues    An absence of chronic health problems or stable and well treated chronic health issues    A positive relationship with his/her clinical medical and/or mental health providers    Having easy access to supportive family members    Having a good community support network    Having restricted access to highly lethal means of suicide     Risk Status   0. - Very Low Risk:  Evaluation Counselors:  Document in Epic / SBAR to counselor \"Very Low Risk\".      Treatment Counselors:  Reassess upon admission as applicable, assess weekly in progress notes under Dimension 3 and summarize in Discharge / Treatment summary under Dimension 3.     Additional information to support suicide risk rating: There was no additional information to provide at this time.       Answers submitted by the patient for this visit:  Patient Health Questionnaire (Submitted on 5/13/2025)  If you checked off any problems, how difficult have these problems made it for you to do your work, take care of things at home, or get along with other people?: Not difficult at all  PHQ9 TOTAL SCORE: 0  Patient Health Questionnaire (G7) (Submitted on 5/13/2025)  MERVAT 7 TOTAL SCORE: 0    "

## 2025-05-14 NOTE — GROUP NOTE
"Group Therapy Documentation    PATIENT'S NAME: Chacho Lucas  MRN:   [id number]  :   2005  ACCT. NUMBER: [id number]  DATE OF SERVICE: 25  START TIME:  9:45 AM  END TIME: 11:30 AM  FACILITATOR(S): Yaritza Payne LADC  TOPIC: BEH Group Therapy  Number of patients attending the group:  6  Group Length:  2 Hours    Dimensions addressed: 3, 4, 5, and 6    Summary of Group / Topics Discussed:    Recovery Principles, Coping/DBT informed care, Disease of addiction, and Emotions/expression    Participants participated in check-in, answering the question \"what are you afraid of?\" They also reflected on lessons from the morning lecture on the disease of addiction, talking about skills to help manage the ups and downs of recovery.    Group Attendance:  Attended group session    Patient's response to the group topic/interactions:  cooperative with task    Patient appeared to be Actively participating, Attentive, and Engaged.        Client specific details: Wei was an active participant in morning group therapy session.    Suicide ideation: 0.         2025    11:00 AM   Suicide Ideation Check In   Since last session, how often have you had suicidal thoughts? No thoughts of suicide          "

## 2025-05-14 NOTE — GROUP NOTE
Group Therapy Documentation    PATIENT'S NAME: Chacho Lucas  MRN:   [id number]  :   2005  ACCT. NUMBER: [id number]  DATE OF SERVICE: 25  START TIME:  8:30 AM  END TIME:  9:30 AM  FACILITATOR(S): Taras López MD; Yaritza Payne LADC; Jed Upton LADC  TOPIC: BEH Group Therapy  Number of patients attending the group:  21  Group Length:  1 Hours    Dimensions addressed: 1, 2, 3, 4, and 5    Summary of Group / Topics Discussed:    Disease of addiction    Group lecture was presented by medical staff regarding the current science of the brain in relation to addiction. Participants provided feedback throughout group session.     Group Attendance:  Attended group session    Patient's response to the group topic/interactions:  cooperative with task    Patient appeared to be Actively participating.        Client specific details:  Patient actively engaged in group discussion.

## 2025-05-15 LAB
AMPHET UR CFM-MCNC: 1380 NG/ML
AMPHET/CREAT UR: 575 NG/MG {CREAT}
BUPRENORPHINE UR CFM-MCNC: 123 NG/ML
BUPRENORPHINE/CREAT UR: 51 NG/MG {CREAT}
FENTANYL UR CFM-MCNC: 13 NG/ML
FENTANYL/CREAT UR: 5 NG/MG {CREAT}
METHAMPHET UR CFM-MCNC: 5340 NG/ML
METHAMPHET/CREAT UR: 2225 NG/MG {CREAT}
NALOXONE UR CFM-MCNC: 130 NG/ML
NALOXONE: 54 NG/MG {CREAT}
NORBUPRENORPHINE UR CFM-MCNC: 263 NG/ML
NORBUPRENORPHINE/CREAT UR: 110 NG/MG {CREAT}
NORFENTANYL UR CFM-MCNC: 150 NG/ML
NORFENTANYL/CREAT UR: 63 NG/MG {CREAT}
RPR SER QL: REACTIVE
RPR SER-TITR: NORMAL {TITER}
T PALLIDUM AB SER QL: REACTIVE

## 2025-05-17 ENCOUNTER — HOSPITAL ENCOUNTER (OUTPATIENT)
Dept: BEHAVIORAL HEALTH | Facility: CLINIC | Age: 20
Discharge: HOME | End: 2025-05-17
Attending: FAMILY MEDICINE
Payer: COMMERCIAL

## 2025-05-17 PROCEDURE — 1002N00001 HC LODGING PLUS FACILITY CHARGE ADULT

## 2025-05-17 PROCEDURE — H2035 A/D TX PROGRAM, PER HOUR: HCPCS | Mod: HQ

## 2025-05-17 NOTE — GROUP NOTE
Group Therapy Documentation    PATIENT'S NAME: Chacho Lucas  MRN:   [id number]  :   2005  ACCT. NUMBER: [id number]  DATE OF SERVICE: 25  START TIME:  9:00 AM  END TIME: 11:00 AM  FACILITATOR(S): Yaritza Payne LADC; aCpri Robison LADC; Jed Upton LADC  TOPIC: BEH Group Therapy  Number of patients attending the group:  21  Group Length:  2 Hours    Dimensions addressed: 3, 4, 5, and 6    Summary of Group / Topics Discussed:    Recovery Principles and Relationship/socialization    Participants took part in a weekend Relationships workshop, highlighting the importance of setting and maintaining healthy boundaries.     Group Attendance:  Attended group session    Patient's response to the group topic/interactions:  cooperative with task    Patient appeared to be Attentive and Engaged.        Client specific details: Wei was an active participant in weekend Relationships Workshop.    Suicide ideation: 0.         2025     9:00 AM   Suicide Ideation Check In   Since last session, how often have you had suicidal thoughts? No thoughts of suicide

## 2025-05-17 NOTE — GROUP NOTE
Group Therapy Documentation    PATIENT'S NAME: Chacho Lucas  MRN:   [id number]  :   2005  ACCT. NUMBER: [id number]  DATE OF SERVICE: 25  START TIME: 12:30 PM  END TIME:  2:30 PM  FACILITATOR(S): Capri Robison LADC; Jed Upton LADC; Yaritza Payne LADC  TOPIC: BEH Group Therapy  Number of patients attending the group:  21  Group Length:  2 Hours    Dimensions addressed: 3, 4, 5, and 6    Summary of Group / Topics Discussed:    Recovery Principles, Sober coping skills, Relationship/socialization, Cognitive behavioral therapy skills, Emotions/expression, and Relapse prevention.   Speaker MS started with a collaboration activity and led to the discussion of recovery sober coping skills = surviving skills when in crisis. Patients participated sufficiently.      Group Attendance:  Attended group session    Patient's response to the group topic/interactions:  cooperative with task    Patient appeared to be Engaged.        Client specific details:  Wei attended PM group and listened attentively.

## 2025-05-18 ENCOUNTER — HOSPITAL ENCOUNTER (OUTPATIENT)
Dept: BEHAVIORAL HEALTH | Facility: CLINIC | Age: 20
Discharge: HOME | End: 2025-05-18
Attending: FAMILY MEDICINE
Payer: COMMERCIAL

## 2025-05-18 LAB
FENTANYL UR-MCNC: 2.1 NG/ML
NORFENTANYL UR-MCNC: 50.4 NG/ML

## 2025-05-18 PROCEDURE — 1002N00001 HC LODGING PLUS FACILITY CHARGE ADULT

## 2025-05-18 PROCEDURE — H2035 A/D TX PROGRAM, PER HOUR: HCPCS | Mod: HQ

## 2025-05-18 NOTE — GROUP NOTE
Group Therapy Documentation    PATIENT'S NAME: Chacho Lucas  MRN:   [id number]  :   2005  ACCT. NUMBER: [id number]  DATE OF SERVICE: 25  START TIME:  8:45 AM  END TIME: 10:30 AM  FACILITATOR(S): Yaritza Payne LADC; Zee Gan RN  TOPIC: BEH Group Therapy  Number of patients attending the group:  21  Group Length:  2 Hours    Dimensions addressed: 1, 2, 3, 4, 5, and 6    Summary of Group / Topics Discussed:    Hepatitis C, with an overview of Hepatitis A and B. Participants also engaged in a Laughter Yoga exercise.      Group Attendance:  Attended group session    Patient's response to the group topic/interactions:  cooperative with task    Patient appeared to be Attentive and Engaged.        Client specific details: Wei [facility] an [facility] on Hep[facility].     Suicide ideation: 0.         2025     9:00 AM   Suicide Ideation Check In   Since last session, how often have you had suicidal thoughts? No thoughts of suicide

## 2025-05-18 NOTE — GROUP NOTE
"Group Therapy Documentation    PATIENT'S NAME: Chacho Lucas  MRN:   [id number]  :   2005  ACCT. NUMBER: [id number]  DATE OF SERVICE: 25  START TIME: 12:30 PM  END TIME:  1:30 PM  FACILITATOR(S): Yaritza Payne LADC; Jed Upton LADC  TOPIC: BEH Group Therapy  Number of patients attending the group:  21  Group Length:  1 Hours    Dimensions addressed: 4, 5, and 6    Summary of Group / Topics Discussed:    Sober coping skills    Group session included a \"Recovery Bingo\" activity, which is designed to help familiarize participants with available tools and resources. Feedback was provided by participants throughout group session.     Group Attendance:  Attended group session    Patient's response to the group topic/interactions:  cooperative with task    Patient appeared to be Actively participating.        Client specific details:  Patient actively engaged in group discussion.    "

## 2025-05-19 ENCOUNTER — HOSPITAL ENCOUNTER (OUTPATIENT)
Dept: BEHAVIORAL HEALTH | Facility: CLINIC | Age: 20
Discharge: HOME | End: 2025-05-19
Attending: FAMILY MEDICINE
Payer: COMMERCIAL

## 2025-05-19 PROCEDURE — H2035 A/D TX PROGRAM, PER HOUR: HCPCS | Mod: HQ

## 2025-05-19 PROCEDURE — 1002N00001 HC LODGING PLUS FACILITY CHARGE ADULT

## 2025-05-19 NOTE — GROUP NOTE
"Group Therapy Documentation    PATIENT'S NAME: Chacho Lucas  MRN:   [id number]  :   2005  ACCT. NUMBER: [id number]  DATE OF SERVICE: 25  START TIME:  9:00 AM  END TIME: 11:00 AM  FACILITATOR(S): Yaritza Payne LADC  TOPIC: BEH Group Therapy  Number of patients attending the group:  8  Group Length:  2 Hours    Dimensions addressed: 3, 4, 5, and 6    Summary of Group / Topics Discussed:    Recovery Principles, Sober coping skills, and Disease of addiction    Participants took part in a daily reading and answered the check-in question \"What was your high and low from the weekend?\" They also participated in a weekly goal setting activity and a group discussion on recovery strategies and working the steps.    Group Attendance:  Attended group session    Patient's response to the group topic/interactions:  cooperative with task    Patient appeared to be Actively participating, Attentive, and Engaged.        Client specific details: Wei was an active participant in morning group therapy session.    Suicide ideation: 0.         2025    11:00 AM   Suicide Ideation Check In   Since last session, how often have you had suicidal thoughts? No thoughts of suicide          "

## 2025-05-19 NOTE — GROUP NOTE
Group Therapy Documentation    PATIENT'S NAME: Chacho Lucas  MRN:   [id number]  :   2005  ACCT. NUMBER: [id number]  DATE OF SERVICE: 25  START TIME: 12:30 PM  END TIME:  2:30 PM  FACILITATOR(S): Jed Upton LADC  TOPIC: BEH Group Therapy  Number of patients attending the group:  8  Group Length:  2 Hours    Dimensions addressed: 3, 4, and 5    Summary of Group / Topics Discussed:    Sober coping skills, Cognitive behavioral therapy skills, Emotions/expression, and Relapse prevention      Group began with a reflection reading regarding being shameful about problems. Group participants then shared assignments, including following up with feedback. Group then concluded with a reading on common thinking errors.     Group Attendance:  Attended group session    Patient's response to the group topic/interactions:  cooperative with task    Patient appeared to be Actively participating.        Client specific details:  Patient actively engaged in group discussion.

## 2025-05-20 ENCOUNTER — HOSPITAL ENCOUNTER (OUTPATIENT)
Dept: BEHAVIORAL HEALTH | Facility: CLINIC | Age: 20
Discharge: HOME | End: 2025-05-20
Attending: FAMILY MEDICINE
Payer: COMMERCIAL

## 2025-05-20 PROCEDURE — H2035 A/D TX PROGRAM, PER HOUR: HCPCS | Mod: HQ

## 2025-05-20 PROCEDURE — 1002N00001 HC LODGING PLUS FACILITY CHARGE ADULT

## 2025-05-20 NOTE — GROUP NOTE
Group Therapy Documentation    PATIENT'S NAME: Chacho Lucas  MRN:   [id number]  :   2005  ACCT. NUMBER: [id number]  DATE OF SERVICE: 25  START TIME:  9:00 AM  END TIME: 11:00 AM  FACILITATOR(S): Jed Upton LADC  TOPIC: BEH Group Therapy  Number of patients attending the group:  8  Group Length:  2 Hours    Dimensions addressed: 3, 4, 5, and 6    Summary of Group / Topics Discussed:    Recovery Principles and Sober coping skills      Group began with check-ins, followed by a reflection reading regarding being open to truth. Group then talked about The Twelve Steps of AA, as well as common hang-ups when approaching the steps. Participants provided feedback throughout group session.     Group Attendance:  Attended group session    Patient's response to the group topic/interactions:  cooperative with task    Patient appeared to be Actively participating.        Client specific details:  Patient actively engaged in group discussion.    Suicide ideation: 0.         2025    11:00 AM   Suicide Ideation Check In   Since last session, how often have you had suicidal thoughts? No thoughts of suicide

## 2025-05-20 NOTE — GROUP NOTE
Group Therapy Documentation    PATIENT'S NAME: Chacho Lucas  MRN:   [id number]  :   2005  ACCT. NUMBER: [id number]  DATE OF SERVICE: 25  START TIME: 12:30 PM  END TIME:  2:30 PM  FACILITATOR(S): Yaritza Payne LADC  TOPIC: BEH Group Therapy  Number of patients attending the group:  8  Group Length:  2 Hours    Dimensions addressed: 3, 4, 5, and 6    Summary of Group / Topics Discussed:    Relationship/socialization, Disease of addiction, and Emotions/expression    Group Attendance:  Attended group session    Patient's response to the group topic/interactions:  cooperative with task    Patient appeared to be Actively participating, Attentive, and Engaged.        Client specific details: Wei was an active participant in afternoon group therapy session.

## 2025-05-20 NOTE — GROUP NOTE
Group Therapy Documentation    PATIENT'S NAME: Chacho Lucas  MRN:   [id number]  :   2005  ACCT. NUMBER: [id number]  DATE OF SERVICE: 25  START TIME:  3:00 PM  END TIME:  4:00 PM  FACILITATOR(S): Yaritza Payne LADC; Jed Upton LADC; Ashlee Bailey LADC  TOPIC: BEH Group Therapy  Number of patients attending the group:  19  Group Length:  1 Hours    Dimensions addressed: 3, 4, 5, and 6    Summary of Group / Topics Discussed:    Recovery Principles and Disease of addiction    Group Attendance:  Attended group session    Patient's response to the group topic/interactions:  cooperative with task    Patient appeared to be Attentive and Engaged.        Client specific details: Wei was an active participant in skills group session.

## 2025-05-21 ENCOUNTER — HOSPITAL ENCOUNTER (OUTPATIENT)
Dept: BEHAVIORAL HEALTH | Facility: CLINIC | Age: 20
Discharge: HOME | End: 2025-05-21
Attending: FAMILY MEDICINE
Payer: COMMERCIAL

## 2025-05-21 DIAGNOSIS — F19.90 SUBSTANCE USE DISORDER: ICD-10-CM

## 2025-05-21 LAB — FENTANYL UR QL: NORMAL

## 2025-05-21 PROCEDURE — 1002N00001 HC LODGING PLUS FACILITY CHARGE ADULT

## 2025-05-21 PROCEDURE — H2035 A/D TX PROGRAM, PER HOUR: HCPCS | Mod: HQ

## 2025-05-21 PROCEDURE — H2035 A/D TX PROGRAM, PER HOUR: HCPCS | Performed by: MARRIAGE & FAMILY THERAPIST

## 2025-05-21 PROCEDURE — 80307 DRUG TEST PRSMV CHEM ANLYZR: CPT

## 2025-05-21 NOTE — PROGRESS NOTES
[facility] Weekly Treatment Plan Review    Treatment plan review for the following date span:  5/14/2025-5/20/2025    ATTENDANCE  Patient did not have any absences during this time period.        Weekly Treatment Plan Review     Treatment Plan initiated on: 5/18/2025.    Dimension1: Acute Intoxication/Withdrawal Potential -   Date of Last Use: 5/10/2025  Any reports of withdrawal symptoms - No      Dimension 2: Biomedical Conditions & Complications -   Medical Concerns:  None reported  Vitals:   BP Readings from Last 3 Encounters:   05/14/25 106/69   05/13/25 114/74   04/29/25 101/64     Pulse Readings from Last 3 Encounters:   05/14/25 72   05/13/25 98   04/29/25 53     Wt Readings from Last 3 Encounters:   05/13/25 59 kg (130 lb)   05/07/25 56.7 kg (125 lb)   03/05/25 66 kg (145 lb 9.6 oz)     Temp Readings from Last 3 Encounters:   05/13/25 98.1  F (36.7  C)      Current Medications & Medication Changes:  Current Outpatient Medications   Medication Sig Dispense Refill    acetaminophen (TYLENOL) 500 MG tablet Take 500-1,000 mg by mouth every 8 hours as needed for mild pain.      alum & mag hydroxide-simethicone (MAALOX) 200-200-20 MG/5ML SUSP suspension Take 30 mLs by mouth every 6 hours as needed for indigestion.      benzocaine-menthol (CHLORASEPTIC) 6-10 MG lozenge Place 1 lozenge inside cheek every 2 hours as needed for sore throat.      Buprenorphine HCl-Naloxone HCl (SUBOXONE) 12-3 MG FILM per film Place 1 Film under the tongue daily. 8 Film 0    guaiFENesin (ROBITUSSIN) 20 mg/mL liquid Take 200 mg by mouth every 4 hours as needed for cough.      ibuprofen (ADVIL/MOTRIN) 200 MG tablet Take 600 mg by mouth every 6 hours as needed for pain.      loratadine (CLARITIN) 10 MG tablet Take 10 mg by mouth daily as needed for allergies.      melatonin 5 MG tablet Take 1 tablet (5 mg) by mouth nightly as needed for sleep. 30 tablet 0    naloxone (NARCAN) 4 MG/0.1ML nasal spray Spray 4 mg into one nostril  alternating nostrils as needed for opioid reversal. every 2-3 minutes until assistance arrives      naloxone (NARCAN) 4 MG/0.1ML nasal spray Spray 1 spray (4 mg) into one nostril alternating nostrils once as needed for opioid reversal. every 2-3 minutes until assistance arrives 0.2 mL 11    nicotine polacrilex (NICORETTE) 4 MG gum 4 mg, buccal, every hour as needed for nicotine withdrawal symptoms. Chew until tingling, then place between cheek and gum. Repeat. Do not swallow. Not to exceed 96 mg (24 pieces) in a 24 hour period 200 each 1    ondansetron (ZOFRAN) 4 MG tablet Take 1 tablet (4 mg) by mouth every 8 hours as needed for nausea. 12 tablet 0    senna-docusate (SENOKOT-S/PERICOLACE) 8.6-50 MG tablet Take 2 tablets by mouth daily as needed for constipation. 60 tablet 1     No current facility-administered medications for this encounter.     Facility-Administered Medications Ordered in Other Encounters   Medication Dose Route Frequency Provider Last Rate Last Admin    Self Administer Medications: Behavioral Services   Does not apply See Admin Instructions Carly Hernandez MD         Taking meds as prescribed? Yes  Medication side effects or concerns:  None reported  Outside medical appointments this week (list provider and reason for visit):  None at this time.       Dimension 3: Emotional/Behavioral Conditions & Complications -   Mental health diagnosis: Anxiety, Depression, and ADHD    Date of last SIB:  No history.  Date of  last SI:  No history.  Date of last HI: No history.  Behavioral Targets:  Stabilize and maintain mental health.   Current MH Assignments:  None at this time.     PHQ2:       5/21/2025     1:00 PM 3/10/2025     2:00 PM 3/15/2022    11:00 AM 3/8/2022    10:00 AM   PHQ-2 ( 1999 [employer])   Q1: Little interest or pleasure in doing things 0 0 1 0   Q2: Feeling down, depressed or hopeless 0 0 0 0   PHQ-2 Score 0 0 1 0      GAD2:       3/5/2025     8:12 AM 3/10/2025     2:00 PM 5/7/2025     2:57  "PM 5/21/2025     1:00 PM   MERVAT-2   Feeling nervous, anxious, or on edge 3 0 0 0   Not being able to stop or control worrying 2 0 0 0   MERVAT-2 Total Score 5  0 0  0       Patient-reported       Additional Narrative:  Current Mental Health symptoms include: None reported at this time.    Active interventions to stabilize mental health symptoms this week : Patient attended group therapy, and was referred for individual sessions with a staff therapist. Patient reported having significant changes in his mood, stating, \"getting better.\" He also reported that his stress level has gone decreased. Patient reported that he utilized breathing as a coping skill to help manage stress and difficult emotions.        Dimension 4: Treatment Acceptance / Resistance -   EVITA Diagnosis:  Opioid Use Disorder, Severe (F11.20)  Cannabis Use Disorder, Severe (F12.20)  Tobacco Use Disorder, Moderate (F17.20)  Commitment to tx process/Stage of change- Contemplation  EVITA assignments - None at this time.       Additional Narrative - Patient participated appropriately in scheduled programming. He reported that he had gotten along well with staff and peers. Patient stated, \"future,\" as his motivation to stay in treatment during this period.       Dimension 5: Relapse / Continued Problem Potential -   Relapses this week - None  Urges to use - None  UA results -   Recent Results (from the past week)   Drugs of Abuse Screen Urine (POC CUPS) POCT    Collection Time: 05/14/25  1:09 PM   Result Value Ref Range    POCT Kit Lot Number [id number]     POCT Kit Expiration Date 2793093     Temperature Urine POCT 94 F 90 F, 92 F, 94 F, 96 F, 98 F, 100 F    Specific [place] POCT 1.025 1.005, 1.015, 1.025    pH Qual Urine POCT 5 pH 4 pH, 5 pH, 7 pH, 9 pH    Creatinine Qual Urine POCT 50 mg/dL 20 mg/dL, 50 mg/dL, 100 mg/dL, 200 mg/dL    Internal QC Qual Urine POCT Valid Valid    Amphetamine Qual Urine POCT Negative Negative    Barbiturate Qual Urine POCT Negative " "Negative    Buprenorphine Qual Urine POCT Screen Positive (A) Negative    Benzodiazepine Qual Urine POCT Negative Negative    Cocaine Qual Urine POCT Negative Negative    Methamphetamine Qual Urine POCT Negative Negative    MDMA Qual Urine POCT Negative Negative    Methadone Qual Urine POCT Negative Negative    Opiate Qual Urine POCT Negative Negative    Oxycodone Qual Urine POCT Negative Negative    Phencyclidine Qual Urine POCT Negative Negative    THC Qual Urine POCT Screen Positive (A) Negative   HIV Antigen Antibody Combo    Collection Time: 05/14/25  2:04 PM   Result Value Ref Range    HIV Antigen Antibody Combo Nonreactive Nonreactive   Hepatitis C Screen Reflex to HCV RNA Quant and Genotype    Collection Time: 05/14/25  2:04 PM   Result Value Ref Range    Hepatitis C Antibody Nonreactive Nonreactive   Treponema Abs w Reflex to RPR and Titer    Collection Time: 05/14/25  2:04 PM   Result Value Ref Range    Treponema Antibody Total Reactive (A) Nonreactive   Rapid Plasma Reagin w Rflx to TITER    Collection Time: 05/14/25  2:04 PM   Result Value Ref Range    Rapid Plasma Reagin Reactive (A) Nonreactive   Rapid Plasma Reagin Titer    Collection Time: 05/14/25  2:04 PM   Result Value Ref Range    Rapid Plasma Reagin Titer 1:1 Non Reactive     Identified triggers - None   Coping skills identified - Patient stated, \"think about future,\" as his motivation.  Patient is able to utilize these skills when needed.    Additional Narrative- Patient participated in relapse prevention related discussions and lectures. Hew attended spirituality group, emphasizing the importance of spiritual health in recovery.     Dimension 6: Recovery Environment -   Family Involvement - Patient reported having contact with mom, siblings, and grandparents.   Community support group attendance - Patient attends 2-3 sobriety support group meetings weekly while in programming.   Recreational activities - None      Additional Narrative - " Patient has malachi spending quality time with peers. He reported interest in aftercare options.     Progress made on transition planning goals: Patient will  be given information continued care options for after treatment. No plans have yet been confirmed.     Justification for Continued Treatment at this Level of Care:  Patient was rated as a high risk for relapse upon admission, and will remain with a high risk rating throughout his time in programming.   Treatment coordination activities this week:    Need for peer recovery support referral? No    Discharge Planning:  Target Discharge Date/Timeframe:  6/10/2025   Med Mgmt Provider/Appt:  N/A   Ind therapy Provider/Appt:  5/21/2025   Other referrals:  N/A        Dimension Scale Review     Prior ratings: Dim1 - 0 DIM2 - 0 DIM3 - 2 DIM4 - 3 DIM5 - 4 DIM6 -3     Current ratings: Dim1 - 0 DIM2 - 0 DIM3 - 2 DIM4 - 3 DIM5 - 4 DIM6 -3       If client is 18 or older, has vulnerable adult status change? No    Are Treatment Plan goals/objectives effective? Yes  *If no, list changes to treatment plan:    Are the current goals meeting client's needs? Yes  *If no, list the changes to treatment plan.      *Client agrees with any changes to the treatment plan: N/A  *Client received copy of changes: No  *Client is aware of right to access a treatment plan review: Yes       ANGIE Fletcher on 5/21/2025 at 12:50 PM

## 2025-05-21 NOTE — GROUP NOTE
Group Therapy Documentation    PATIENT'S NAME: Chacho Lucas  MRN:   [id number]  :   2005  ACCT. NUMBER: [id number]  DATE OF SERVICE: 25  START TIME:  8:30 AM  END TIME:  9:30 AM  FACILITATOR(S): Zenia Buenrostro LADC; Johnson Pierre LADC  TOPIC: BEH Group Therapy  Number of patients attending the group:  16  Group Length:  1 Hours    Dimensions addressed: 2    Summary of Group / Topics Discussed:    Disease of addiction and Medication management    Group Attendance:  Attended group session    Patient's response to the group topic/interactions:  cooperative with task    Patient appeared to be Attentive and Engaged.        Client specific details: Pt listened respectfully to a presentation on GI/liver function and asked appropriate questions.

## 2025-05-21 NOTE — GROUP NOTE
Group Therapy Documentation    PATIENT'S NAME: Chacho Lucas  MRN:   [id number]  :   2005  ACCT. NUMBER: [id number]  DATE OF SERVICE: 25  START TIME:  9:30 AM  END TIME: 11:15 AM  FACILITATOR(S): Ashlee Bailey LADC; Jed Upton LADC  TOPIC: BEH Group Therapy  Number of patients attending the group:  9  Group Length:  2 Hours    Dimensions addressed: 4 and 5    Summary of Group / Topics Discussed:    Sober coping skills and Relapse prevention      Group began with check-ins, followed by a participants sharing treatment assignments. Feedback was provided by participants throughout group session.     Group Attendance:  Attended group session    Patient's response to the group topic/interactions:  cooperative with task    Patient appeared to be Actively participating.        Client specific details:  Patient actively engaged in group discussion.    Suicide ideation: 0.         2025    10:00 AM   Suicide Ideation Check In   Since last session, how often have you had suicidal thoughts? No thoughts of suicide
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
## 2025-05-21 NOTE — GROUP NOTE
Group Therapy Documentation    PATIENT'S NAME: Chacho Lucas  MRN:   [id number]  :   2005  ACCT. NUMBER: [id number]  DATE OF SERVICE: 25  START TIME: 12:30 PM  END TIME:  2:30 PM  FACILITATOR(S): Wendy Darby; Jed Upton LADC  TOPIC: BEH Group Therapy  Number of patients attending the group:  9  Group Length:  2 Hours    Dimensions addressed: 4 and 5    Summary of Group / Topics Discussed:    Spiritual Care    Group was facilitated by spiritual services staff.  Group discussion regarded the importance of spiritual health in recovery. Participants provided feedback throughout group session.     Group Attendance:  Attended group session    Patient's response to the group topic/interactions:  cooperative with task    Patient appeared to be Actively participating.        Client specific details:  Patient actively engaged in group discussion.

## 2025-05-23 ENCOUNTER — HOSPITAL ENCOUNTER (OUTPATIENT)
Dept: BEHAVIORAL HEALTH | Facility: CLINIC | Age: 20
Discharge: HOME | End: 2025-05-23
Attending: FAMILY MEDICINE
Payer: COMMERCIAL

## 2025-05-23 PROCEDURE — 1002N00001 HC LODGING PLUS FACILITY CHARGE ADULT

## 2025-05-23 PROCEDURE — H2035 A/D TX PROGRAM, PER HOUR: HCPCS | Mod: HQ

## 2025-05-23 NOTE — GROUP NOTE
Group Therapy Documentation    PATIENT'S NAME: Chacho Lucas  MRN:   [id number]  :   2005  ACCT. NUMBER: [id number]  DATE OF SERVICE: 25  START TIME: 12:30 PM  END TIME:  2:30 PM  FACILITATOR(S): Johnson Pierre LADC; Mac Troncoso LADC  TOPIC: BEH Group Therapy  Number of patients attending the group:  6  Group Length:  2 Hours    Dimensions addressed: 1, 2, 3, 4, 5, and 6    Summary of Group / Topics Discussed:    Recovery Principles, Leisure explorations/use of leisure time, and Self-care activities    Group Attendance:  Attended group session    Patient's response to the group topic/interactions:  cooperative with task    Patient appeared to be Actively participating.        Client specific details:  Wei participated and interacted appropriately with peers and staff in PM group. No triggers to use noted or discussed.

## 2025-05-23 NOTE — GROUP NOTE
Group Therapy Documentation    PATIENT'S NAME: Chacho Lucas  MRN:   [id number]  :   2005  ACCT. NUMBER: [id number]  DATE OF SERVICE: 25  START TIME:  9:00 AM  END TIME: 11:00 AM  FACILITATOR(S): Jed Upton LADC  TOPIC: BEH Group Therapy  Number of patients attending the group:  9  Group Length:  2 Hours    Dimensions addressed: 3, 4, 5, and 6    Summary of Group / Topics Discussed:    Sober coping skills, Balanced lifestyle, and Relapse prevention    Group began with check-ins, followed by a reflection reading regarding intimacy in relationships. Group then discussed ways to address stress and maintaining a balanced lifestyle.     Group Attendance:  Attended group session    Patient's response to the group topic/interactions:  cooperative with task    Patient appeared to be Actively participating.        Client specific details:  Patient actively engaged in group discussion.      Suicide ideation: 0.         2025    11:00 AM   Suicide Ideation Check In   Since last session, how often have you had suicidal thoughts? No thoughts of suicide

## 2025-05-24 ENCOUNTER — HOSPITAL ENCOUNTER (OUTPATIENT)
Dept: BEHAVIORAL HEALTH | Facility: CLINIC | Age: 20
Discharge: HOME | End: 2025-05-24
Attending: FAMILY MEDICINE
Payer: COMMERCIAL

## 2025-05-24 PROCEDURE — 1002N00001 HC LODGING PLUS FACILITY CHARGE ADULT

## 2025-05-24 PROCEDURE — H2035 A/D TX PROGRAM, PER HOUR: HCPCS | Mod: HQ | Performed by: COUNSELOR

## 2025-05-24 NOTE — GROUP NOTE
Psychoeducation Group Documentation    PATIENT'S NAME: Chacho Lucas  MRN:   [id number]  :   2005  ACCT. NUMBER: [id number]  DATE OF SERVICE: 25  START TIME:  9:00 AM  END TIME: 11:00 AM  FACILITATOR(S): Eliana Lyons LADC; Sidney Liu LADC; Ashlee Bailey LADC  TOPIC: BEH Pyschoeducation  Number of patients attending the group:  17  Group Length:  2 Hours    Skills Group Therapy Type: Recovery skills and Healthy behaviors development    Summary of Group / Topics Discussed:    Balanced lifestyle skills and Relapse prevention skills    Patients received lectures on the progression of addiction and boundaries.  Patients were informed of the layers and levels of boundaries vs. Black and white boundaries.      Group Attendance:  Attended group session    Patient's response to the group topic/interactions:  cooperative with task and listened actively    Patient appeared to be Attentive and Engaged.         Client specific details:  Patient actively attended to these lectures.    Suicide ideation: 0.         2025     9:00 AM   Suicide Ideation Check In   Since last session, how often have you had suicidal thoughts? No thoughts of suicide

## 2025-05-24 NOTE — GROUP NOTE
Psychoeducation Group Documentation    PATIENT'S NAME: Chacho Lucas  MRN:   [id number]  :   2005  ACCT. NUMBER: [id number]  DATE OF SERVICE: 25  START TIME: 12:30 PM  END TIME:  2:30 PM  FACILITATOR(S): Eliana Lyons LADC; Ashlee Bailey LADC; Sidney Liu LADC  TOPIC: BEH Pyschoeducation  Number of patients attending the group:  17  Group Length:  2 Hours    Skills Group Therapy Type: Recovery skills, Daily living/independence skills, and Healthy behaviors development    Summary of Group / Topics Discussed:    Balanced lifestyle skills, Symptom management skills, and Relapse prevention skills    Patients participated in an interactive lecture regarding motivation for recovery and relapse prevention.  Patients identified the voice of addiction and the voice of recovery and discussed goals they will be able to achieve if they continue their lifestyle of recovery.    Group Attendance:  Attended group session    Patient's response to the group topic/interactions:  cooperative with task and listened actively    Patient appeared to be Attentive and Engaged.         Client specific details:  Patient actively participated in this lecture.

## 2025-05-25 ENCOUNTER — HOSPITAL ENCOUNTER (OUTPATIENT)
Dept: BEHAVIORAL HEALTH | Facility: CLINIC | Age: 20
Discharge: HOME | End: 2025-05-25
Attending: FAMILY MEDICINE
Payer: COMMERCIAL

## 2025-05-25 PROCEDURE — 1002N00001 HC LODGING PLUS FACILITY CHARGE ADULT

## 2025-05-25 PROCEDURE — H2035 A/D TX PROGRAM, PER HOUR: HCPCS | Mod: HQ

## 2025-05-25 PROCEDURE — H2035 A/D TX PROGRAM, PER HOUR: HCPCS | Mod: HQ | Performed by: COUNSELOR

## 2025-05-25 NOTE — GROUP NOTE
Psychoeducation Group Documentation    PATIENT'S NAME: Chacho Lucas  MRN:   [id number]  :   2005  ACCT. NUMBER: [id number]  DATE OF SERVICE: 25  START TIME:  8:45 AM  END TIME: 10:30 AM  FACILITATOR(S): Eliana Lyons LADC; Kristy Burns RN; Ashlee Bailey LADC  TOPIC: BEH Pyschoeducation  Number of patients attending the group:  17  Group Length:  1.5 Hours    Skills Group Therapy Type: Healthy behaviors development    Summary of Group / Topics Discussed:    Balanced lifestyle skills, Symptom management skills, and Medication management skills    Patients received a psychoeducation lecture by program RN regarding nutrition, exercise, sleep, and pregnancy with EVITA.        Group Attendance:  Attended group session    Patient's response to the group topic/interactions:  cooperative with task and listened actively    Patient appeared to be Attentive and Engaged.         Client specific details:  Patient actively participated in this lecture.    Suicide ideation: 0.         2025    10:00 AM   Suicide Ideation Check In   Since last session, how often have you had suicidal thoughts? No thoughts of suicide

## 2025-05-25 NOTE — GROUP NOTE
Group Therapy Documentation    PATIENT'S NAME: Cahcho Lucas  MRN:   [id number]  :   2005  ACCT. NUMBER: [id number]  DATE OF SERVICE: 25  START TIME: 12:30 PM  END TIME:  1:30 PM  FACILITATOR(S): Ashlee Bailey LADC  TOPIC: BEH Group Therapy  Number of patients attending the group:  18    Group Length:  1 Hours    Dimensions addressed: 3 and 4    Summary of Group / Topics Discussed:    Emotions/expression    Group Attendance:  Attended group session    Patient's response to the group topic/interactions:  cooperative with task    Patient appeared to be Actively participating, Attentive, and Engaged.        Client specific details:  Patient attended emotional management lecture and was attentive and participative..

## 2025-05-26 ENCOUNTER — HOSPITAL ENCOUNTER (OUTPATIENT)
Dept: BEHAVIORAL HEALTH | Facility: CLINIC | Age: 20
Discharge: HOME | End: 2025-05-26
Attending: FAMILY MEDICINE
Payer: COMMERCIAL

## 2025-05-26 PROCEDURE — H2035 A/D TX PROGRAM, PER HOUR: HCPCS | Mod: HQ

## 2025-05-26 PROCEDURE — 1002N00001 HC LODGING PLUS FACILITY CHARGE ADULT

## 2025-05-26 PROCEDURE — H2035 A/D TX PROGRAM, PER HOUR: HCPCS | Mod: HQ | Performed by: COUNSELOR

## 2025-05-26 NOTE — GROUP NOTE
Psychoeducation Group Documentation    PATIENT'S NAME: Chacho Lucas  MRN:   [id number]  :   2005  ACCT. NUMBER: [id number]  DATE OF SERVICE: 25  START TIME:  9:00 AM  END TIME: 11:00 AM  FACILITATOR(S): Eliana Lyons LADC; Mac Troncoso LADC  TOPIC: BEH Pyschoeducation  Number of patients attending the group:  17  Group Length:  2 Hours    Skills Group Therapy Type: Recovery skills, Healthy behaviors development, and Relationship skills development    Summary of Group / Topics Discussed:    Relationship/social skills, Symptom management skills, Medication management skills, and Relapse prevention skills    Counselor provided a stewart talk regarding the family's response to addiction.  Patients discussed boundary setting, creating new family dynamics, and insights related to the family's experience with addiction.      Group Attendance:  Attended group session    Patient's response to the group topic/interactions:  cooperative with task and listened actively    Patient appeared to be Attentive and Engaged.         Client specific details:  Patient actively participated in these groups.    Suicide ideation: 0.         2025     9:00 AM   Suicide Ideation Check In   Since last session, how often have you had suicidal thoughts? No thoughts of suicide

## 2025-05-26 NOTE — GROUP NOTE
Group Therapy Documentation    PATIENT'S NAME: Chacho Lucas  MRN:   [id number]  :   2005  ACCT. NUMBER: [id number]  DATE OF SERVICE: 25  START TIME: 12:30 PM  END TIME:  2:30 PM  FACILITATOR(S): Mac Troncoso LADC; Eliana Lyons LADC  TOPIC: BEH Group Therapy  Number of patients attending the group:  17  Group Length:  2 Hours    Dimensions addressed: 3, 4, and 5    Summary of Group / Topics Discussed:    Recovery Principles, Sober coping skills, Relationship/socialization, Balanced lifestyle, Disease of addiction, Emotions/expression, Leisure explorations/use of leisure time, Relapse prevention, and Self-care activities    Group Attendance:  Attended group session    Patient's response to the group topic/interactions:  cooperative with task    Patient appeared to be Attentive and Engaged.        Client specific details:  The patient participated in the afternoon lecture which was a recovery related activity followed by a discussion on addiction, mental health, and the need to set boundaries.

## 2025-05-27 ENCOUNTER — HOSPITAL ENCOUNTER (OUTPATIENT)
Dept: BEHAVIORAL HEALTH | Facility: CLINIC | Age: 20
Discharge: HOME | End: 2025-05-27
Attending: FAMILY MEDICINE
Payer: COMMERCIAL

## 2025-05-27 PROCEDURE — 1002N00001 HC LODGING PLUS FACILITY CHARGE ADULT

## 2025-05-27 PROCEDURE — H2035 A/D TX PROGRAM, PER HOUR: HCPCS | Performed by: MARRIAGE & FAMILY THERAPIST

## 2025-05-27 PROCEDURE — H2035 A/D TX PROGRAM, PER HOUR: HCPCS | Mod: HQ

## 2025-05-27 NOTE — GROUP NOTE
Group Therapy Documentation    PATIENT'S NAME: Chacho Lucas  MRN:   [id number]  :   2005  ACCT. NUMBER: [id number]  DATE OF SERVICE: 25  START TIME: 12:30 PM  END TIME:  2:30 PM  FACILITATOR(S): Ashlee Bailey LADC  TOPIC: BEH Group Therapy  Number of patients attending the group:  6    Group Length:  2 Hours    Dimensions addressed: 3, 4, and 5    Summary of Group / Topics Discussed:    Recovery Principles, Balanced lifestyle, and Relapse prevention    Group Attendance:  Attended group session    Patient's response to the group topic/interactions:  cooperative with task    Patient appeared to be Actively participating, Attentive, and Engaged.        Client specific details:  Patient attended attended group session and was attentive and participative.

## 2025-05-27 NOTE — GROUP NOTE
Group Therapy Documentation    PATIENT'S NAME: Chacho Lucas  MRN:   [id number]  :   2005  ACCT. NUMBER: [id number]  DATE OF SERVICE: 25  START TIME:  9:00 AM  END TIME: 11:00 AM  FACILITATOR(S): Jed Upton LADC  TOPIC: BEH Group Therapy  Number of patients attending the group:  6  Group Length:  2 Hours    Dimensions addressed: 4, 5, and 6    Summary of Group / Topics Discussed:    Sober coping skills, Relationship/socialization, Disease of addiction, Emotions/expression, and Relapse prevention      Group began with check-ins, followed by a reflection reading regarding learning about suffering through watching animals. A group member then shared his First Step Assignment, followed by feedback.     Group Attendance:  Attended group session and Excused    Patient's response to the group topic/interactions:  cooperative with task    Patient appeared to be Actively participating.        Client specific details:  Patient actively engaged in group discussion. Patient was excused from the second half of group for a therapy session.    Suicide ideation: 0.         2025     9:00 AM   Suicide Ideation Check In   Since last session, how often have you had suicidal thoughts? No thoughts of suicide

## 2025-05-27 NOTE — PROGRESS NOTES
"INDIVIDUAL SESSION SUMMARY    D) Met with client on 5/27/25 from 10:00-10:45am. Client is in the [facility] program. Client discussed goals for this week which include contacting his , John Charles, continuing in his exercise plan, looking into schools/understanding FAFSA, and connecting with his peers. Client shared that his aftercare plan it to live with his mom in [place], look for work.school and attend NA meetings. Client shared he fears \"overwhelming himself\" and potential legal consequences. Client expressed remorse for behaviors while he was using.     I)  Provided client with verbal interventions including validation, compassion, and support, Provided positive reinforcement for progress towards goals, gains in insight, and application of skills, and Discussed the importance of recovery behaviors such as forming/utilizing a sober support network, daily rituals, goal setting, and identifying relapse warning signs.    A)   Client appears to be gaining insights into their emotions and using healthy coping skills for managing stress, Client appears to be forming relationships with their peers, Client appears to understand the importance of a sober support network, Client appears motivated to seek a new daily routine, meaningful activities, and a sense of purpose, and Assigned client to read The Four Agreements and take notes for the next session.    P) Next session is scheduled for 6/3/25.     SARAVANAN Hernandez, SARAVANAN  5/27/2025    "

## 2025-05-27 NOTE — GROUP NOTE
Psychoeducation Group Documentation    PATIENT'S NAME: Chacho Lucas  MRN:   [id number]  :   2005  ACCT. NUMBER: [id number]  DATE OF SERVICE: 25  START TIME:  3:00 PM  END TIME:  4:00 PM  FACILITATOR(S): Kristy Burns RN; Jed Upton LAD; Ashlee Bailey LADC  TOPIC: BEH Pyschoeducation  Number of patients attending the group:  18  Group Length:  1 Hours    Skills Group Therapy Type: Health    Summary of Group / Topics Discussed:    Group lecture was facilitated by nursing staff regarding how to navigate the available medical resources in the community. Participants provided feedback throughout group session.         Group Attendance:  Attended group session    Patient's response to the group topic/interactions:  cooperative with task    Patient appeared to be Actively participating.         Client specific details:  Patient actively engaged in group lecture/discussion.

## 2025-05-27 NOTE — PROGRESS NOTES
[facility] Weekly Treatment Plan Review    Treatment plan review for the following date span:  5/21/2025-5/27/2025    ATTENDANCE  Patient attended an individual therapy appointment on 5/21.      Weekly Treatment Plan Review     Treatment Plan initiated on: 5/18/2025.    Dimension1: Acute Intoxication/Withdrawal Potential -   Date of Last Use: 5/10/2025  Any reports of withdrawal symptoms - No      Dimension 2: Biomedical Conditions & Complications -   Medical Concerns:  None reported  Vitals:   BP Readings from Last 3 Encounters:   05/14/25 106/69   05/13/25 114/74   04/29/25 101/64     Pulse Readings from Last 3 Encounters:   05/14/25 72   05/13/25 98   04/29/25 53     Wt Readings from Last 3 Encounters:   05/13/25 59 kg (130 lb)   05/07/25 56.7 kg (125 lb)   03/05/25 66 kg (145 lb 9.6 oz)     Temp Readings from Last 3 Encounters:   05/13/25 98.1  F (36.7  C)      Current Medications & Medication Changes:  Current Outpatient Medications   Medication Sig Dispense Refill    acetaminophen (TYLENOL) 500 MG tablet Take 500-1,000 mg by mouth every 8 hours as needed for mild pain.      alum & mag hydroxide-simethicone (MAALOX) 200-200-20 MG/5ML SUSP suspension Take 30 mLs by mouth every 6 hours as needed for indigestion.      benzocaine-menthol (CHLORASEPTIC) 6-10 MG lozenge Place 1 lozenge inside cheek every 2 hours as needed for sore throat.      Buprenorphine HCl-Naloxone HCl (SUBOXONE) 12-3 MG FILM per film Place 1 Film under the tongue daily. 8 Film 0    guaiFENesin (ROBITUSSIN) 20 mg/mL liquid Take 200 mg by mouth every 4 hours as needed for cough.      ibuprofen (ADVIL/MOTRIN) 200 MG tablet Take 600 mg by mouth every 6 hours as needed for pain.      loratadine (CLARITIN) 10 MG tablet Take 10 mg by mouth daily as needed for allergies.      melatonin 5 MG tablet Take 1 tablet (5 mg) by mouth nightly as needed for sleep. 30 tablet 0    naloxone (NARCAN) 4 MG/0.1ML nasal spray Spray 4 mg into one nostril  alternating nostrils as needed for opioid reversal. every 2-3 minutes until assistance arrives      naloxone (NARCAN) 4 MG/0.1ML nasal spray Spray 1 spray (4 mg) into one nostril alternating nostrils once as needed for opioid reversal. every 2-3 minutes until assistance arrives 0.2 mL 11    nicotine polacrilex (NICORETTE) 4 MG gum 4 mg, buccal, every hour as needed for nicotine withdrawal symptoms. Chew until tingling, then place between cheek and gum. Repeat. Do not swallow. Not to exceed 96 mg (24 pieces) in a 24 hour period 200 each 1    ondansetron (ZOFRAN) 4 MG tablet Take 1 tablet (4 mg) by mouth every 8 hours as needed for nausea. 12 tablet 0    senna-docusate (SENOKOT-S/PERICOLACE) 8.6-50 MG tablet Take 2 tablets by mouth daily as needed for constipation. 60 tablet 1     No current facility-administered medications for this encounter.     Facility-Administered Medications Ordered in Other Encounters   Medication Dose Route Frequency Provider Last Rate Last Admin    Self Administer Medications: Behavioral Services   Does not apply See Admin Instructions Carly Hernandez MD         Taking meds as prescribed? Yes  Medication side effects or concerns:  None reported  Outside medical appointments this week (list provider and reason for visit):  None at this time.       Dimension 3: Emotional/Behavioral Conditions & Complications -   Mental health diagnosis: Anxiety, Depression, and ADHD    Date of last SIB:  No history.  Date of  last SI:  No history.  Date of last HI: No history.  Behavioral Targets:  Stabilize and maintain mental health.   Current MH Assignments:  None at this time.     PHQ2:       5/27/2025    10:00 AM 5/21/2025     1:00 PM 3/10/2025     2:00 PM 3/15/2022    11:00 AM 3/8/2022    10:00 AM   PHQ-2 ( 1999 [employer])   Q1: Little interest or pleasure in doing things 0 0 0 1 0   Q2: Feeling down, depressed or hopeless 0 0 0 0 0   PHQ-2 Score 0 0 0 1 0      GAD2:       3/5/2025     8:12 AM 3/10/2025     " 2:00 PM 5/7/2025     2:57 PM 5/21/2025     1:00 PM 5/27/2025    10:00 AM   MERVAT-2   Feeling nervous, anxious, or on edge 3 0 0 0 0   Not being able to stop or control worrying 2 0 0 0 0   MERVAT-2 Total Score 5  0 0  0 0       Patient-reported       Additional Narrative:  Current Mental Health symptoms include: None reported at this time.    Active interventions to stabilize mental health symptoms this week : Patient attended group therapy, and attended an individual session with a staff therapist. Patient reported having significant changes in his mood, stating, \"feeling better.\" He reported no change to his stress level. Patient reported that he utilized \"working out\" as a coping skill to help manage stress and difficult emotions.        Dimension 4: Treatment Acceptance / Resistance -   EVITA Diagnosis:  Opioid Use Disorder, Severe (F11.20)  Cannabis Use Disorder, Severe (F12.20)  Tobacco Use Disorder, Moderate (F17.20)  Commitment to tx process/Stage of change- Contemplation  EVITA assignments - First Step, Drug Use History      Additional Narrative - Patient participated appropriately in scheduled programming. He reported that he had gotten along well with staff and peers. Patient stated, \"future,\" as his motivation to stay in treatment during this period.       Dimension 5: Relapse / Continued Problem Potential -   Relapses this week - None  Urges to use - None  UA results - 5/21/25 -- Positive for Bup and THC, negative for all other screened drugs    Identified triggers - None   Coping skills identified - Patient stated, \"work out\".  Patient is able to utilize these skills when needed.    Additional Narrative- Patient participated in relapse prevention related discussions and lectures. Hew attended spirituality group, emphasizing the importance of spiritual health in recovery.     Dimension 6: Recovery Environment -   Family Involvement - Patient reported having contact with mom, siblings, and grandparents. "   Community support group attendance - Patient attends 2-3 sobriety support group meetings weekly while in programming.   Recreational activities - None      Additional Narrative - Patient has malachi spending quality time with peers. He reported interest in aftercare options.     Progress made on transition planning goals: Patient will  be given information continued care options for after treatment. No plans have yet been confirmed.     Justification for Continued Treatment at this Level of Care:  Patient was rated as a high risk for relapse upon admission, and will remain with a high risk rating throughout his time in programming.   Treatment coordination activities this week:  None  Need for peer recovery support referral? No    Discharge Planning:  Target Discharge Date/Timeframe:  6/10/2025   Med Mgmt Provider/Appt:  N/A   Ind therapy Provider/Appt:  NA   Other referrals:  N/A        Dimension Scale Review     Prior ratings: Dim1 - 0 DIM2 - 0 DIM3 - 2 DIM4 - 3 DIM5 - 4 DIM6 -3     Current ratings: Dim1 - 1 DIM2 - 0 DIM3 - 2 DIM4 - 3 DIM5 - 4 DIM6 -3       If client is 18 or older, has vulnerable adult status change? No    Are Treatment Plan goals/objectives effective? Yes  *If no, list changes to treatment plan:    Are the current goals meeting client's needs? Yes  *If no, list the changes to treatment plan.      *Client agrees with any changes to the treatment plan: N/A  *Client received copy of changes: No  *Client is aware of right to access a treatment plan review: Yes

## 2025-05-28 ENCOUNTER — HOSPITAL ENCOUNTER (OUTPATIENT)
Dept: BEHAVIORAL HEALTH | Facility: CLINIC | Age: 20
Discharge: HOME | End: 2025-05-28
Attending: FAMILY MEDICINE
Payer: COMMERCIAL

## 2025-05-28 ENCOUNTER — OFFICE VISIT (OUTPATIENT)
Dept: BEHAVIORAL HEALTH | Facility: CLINIC | Age: 20
End: 2025-05-28
Payer: COMMERCIAL

## 2025-05-28 VITALS — DIASTOLIC BLOOD PRESSURE: 75 MMHG | HEART RATE: 60 BPM | SYSTOLIC BLOOD PRESSURE: 112 MMHG

## 2025-05-28 DIAGNOSIS — F11.20 OPIOID USE DISORDER, SEVERE, DEPENDENCE (H): Primary | ICD-10-CM

## 2025-05-28 DIAGNOSIS — F12.20 CANNABIS USE DISORDER, MODERATE, DEPENDENCE (H): ICD-10-CM

## 2025-05-28 DIAGNOSIS — Z79.891 ENCOUNTER FOR MONITORING OPIOID MAINTENANCE THERAPY: ICD-10-CM

## 2025-05-28 DIAGNOSIS — Z51.81 ENCOUNTER FOR MONITORING OPIOID MAINTENANCE THERAPY: ICD-10-CM

## 2025-05-28 DIAGNOSIS — F17.200 NICOTINE USE DISORDER: ICD-10-CM

## 2025-05-28 LAB

## 2025-05-28 PROCEDURE — 1002N00001 HC LODGING PLUS FACILITY CHARGE ADULT

## 2025-05-28 PROCEDURE — H2035 A/D TX PROGRAM, PER HOUR: HCPCS | Mod: HQ

## 2025-05-28 PROCEDURE — 99207 PR NO CHARGE LOS: CPT

## 2025-05-28 RX ORDER — BUPRENORPHINE AND NALOXONE 12; 3 MG/1; MG/1
1 FILM, SOLUBLE BUCCAL; SUBLINGUAL DAILY
Qty: 14 FILM | Refills: 0 | Status: SHIPPED | OUTPATIENT
Start: 2025-05-28

## 2025-05-28 ASSESSMENT — PATIENT HEALTH QUESTIONNAIRE - PHQ9: SUM OF ALL RESPONSES TO PHQ QUESTIONS 1-9: 0

## 2025-05-28 NOTE — PROGRESS NOTES
[facility] Follow Up    ASSESSMENT/PLAN                                                      1. Opioid use disorder, severe, dependence (H) (Primary)  Reporting symptoms are controlled with 12 mg of suboxone daily. Continue suboxone unchanged.  Still in programming at  for 2 more weeks. Planning to return to his parents and attend meetings. PRS met with patient and  provided him resources for meetings  Confirms narcan access  Follow up 2 weeks before he leaves   - Buprenorphine HCl-Naloxone HCl (SUBOXONE) 12-3 MG FILM per film; Place 1 Film under the tongue daily.  Dispense: 14 Film; Refill: 0    2. Encounter for monitoring opioid maintenance therapy  - Fentanyl, Qualitative, With Reflex to Quant Urine  - Drugs of Abuse Screen Urine (POC CUPS) POCT    3. Cannabis use disorder, moderate, dependence (H)  Is not currently using THC, plans to return to use when he leaves treatment. Encouraged to find out if THC use is allowed while on probation.     4. Nicotine use disorder  Is smoking while in treatment, plans to resume vaping. Not interested in quitting. Has NRT available.                Return in about 13 days (around 6/10/2025) for Follow up, in person 0830.      Patient counseling completed today:  Discussed mechanism of action, potential risks/benefits/side effects of medications and other recommendations above.     Discussed risk of precipitated withdrawal with initiation of buprenorphine in the presence of full opioid agonists.    Reviewed directions for initiation of buprenorphine to reduce risk of precipitated withdrawal and maximize efficacy.    Harm reduction counseling including never use alone, availability of naloxone, risks associated with concurrent use of opioids and benzodiazepines, alcohol, or other sedatives, safer administration as applicable.  Discussed importance of avoiding isolation, building a network of supportive relationships, avoiding people/places/things  "associated with past use to reduce risk of relapse; including motivational interviewing regarding psychosocial interventions.   SUBJECTIVE                                                          CC/HPI:  Chacho Lucas is a 19 year old male with PMH of opioid use disorder  who presents to the [facility] for return visit.      First Recovery Clinic visit: 3/8/22  Last date of illicit opioid use: 2/11/25     Brief history of use:   Started using illicitly manufactured fentanyl tablets at age 15. Started with 1 pill per day. Taking 15 pills per day at first [facility] visit.  Prior to pills he was using cannabis and occasional xanax. Continues to smoke cannabis daily. He has tried to quit using pills twice, when he stopped he was taking Suboxone off the street. First RC visit on 3/8/22, start on 8 mg TDD - up to 12 mg if needed. He was lost follow-up, returning to clinic on 7/25/22.  Has had continued inconsistent adherence to buprenorphine rx and ongoing use of fentanyl.  Started outpatient [facility] with [facility] 10/20/22.     Lost to follow up 10/21/2022, RTC 2/18/2025 and restarted on buprenorphine  Discharged from  3/11 due to behavioral issues (not attending group)  Evaluated at [facility] Addiction Medicine clinic 3/5 with new diagnosis of PTSD.     5/28/25 HPI:    Things are going well. Has 12 more days of treatment at . Planning to go back to his parents home and attend meetings.   Is taking 12 mg of suboxone daily, plans to continue after he leaves treatment  Constipation managed with senna.  Has a warrant that he needs to address when he leaves. Needs to get ahold of his  before doing so.   Mood is \"good\"  Sleeping okay with melatonin.       Recommendations last visit: 5/14/25    1. Opioid use disorder, severe, dependence (H) (Primary)  Reporting last use remains 2/11/25. Patient resumed residential programming at  yesterday and presents for follow up. At his last visit, he reported taking 3-6 " mg of suboxone intermittently to control symptoms, and was encouraged to increase suboxone back to 12 mg /day. He reports taking 6 mg daily but did take 12 mg today as prescribed while at [facility]. He is reluctant to take suboxone due to dental concerns. Discussed sublocade and patient is not interested at this time.   Continue suboxone 12 mg daily.   Reviewed oral hygiene with buprenorphine.  Continue programming at [facility]  Discussion about avoiding social and environmental triggers.   Confirms narcan access    2. Encounter for monitoring opioid maintenance therapy  - Drugs of Abuse Screen Urine (POC CUPS) POCT    3. Insomnia, unspecified type  Melatonin previously effective while in treatment, providing refill.   - melatonin 5 MG tablet; Take 1 tablet (5 mg) by mouth nightly as needed for sleep.  Dispense: 30 tablet; Refill: 0    4. Nausea  Reporting intermittent nausea for last 2 weeks, requesting medication to take as needed. Denies other symptoms of opiate withdrawal, does endorse constipation.   - ondansetron (ZOFRAN) 4 MG tablet; Take 1 tablet (4 mg) by mouth every 8 hours as needed for nausea.  Dispense: 12 tablet; Refill: 0    5. Opioid-induced constipation  Providing refill of senna to manage symptoms as needed.   - senna-docusate (SENOKOT-S/PERICOLACE) 8.6-50 MG tablet; Take 2 tablets by mouth daily as needed for constipation.  Dispense: 60 tablet; Refill: 1    6. Cannabis use disorder, moderate, dependence (H)  Using THC daily prior to starting [facility]. Monitor with withdrawal.     7. Nicotine use disorder  Has NRT available while at [facility].     Substance Use History:   Opioids:   Age at first use: 15  Current use: timing of last use: 2/11/25; substance: percs; quantity 15 pills daily; route: smoke and snort                 IV drug use: No   History of overdose: No  Previous treatments : No  Longest period of sobriety: 2 weeks  Medical complications related to substance use: denies  Hepatitis C: nonreactive on 3/8/22  HIV:  nonreactive on 3/8/22     Other Addiction History:  Stimulants:   Past use: denies  Use in last 6 months: denies  Sedatives/hypnotics/anxiolytics:   Past use: [id number] use Xanax once per month   Use in last 6 months: denies  Alcohol:   Past use: drinking 4 beers, 3x per month. Last drink 4/23/25  Use in last 6 months: denies  Nicotine:   Cigarettes: occasional use. Will be smoking while in [facility]   Vaping: daily  Chewing tobacco: denies  Cannabis:   Past use: daily use, smoking, last use 5/11/25  Hallucinogens:   Past use: denies  Use in last 6 months: denies  Behavioral addictions:   Denies      PAST PSYCHIATRIC HISTORY:  Diagnoses- ADHD, PTDS  Suicide Attempts: No   Hospitalizations: No        5/13/2025    10:11 AM 5/14/2025     1:00 PM 5/28/2025    10:00 AM   PHQ   PHQ-9 Total Score 0  0 0   Q9: Thoughts of better off dead/self-harm past 2 weeks Not at all Not at all Not at all       Patient-reported       Social History  Housing status: with grandparents, mother, siblings and uncle  Employment status: Unemployed, not seeking work  Relationship status: Single  Children: no children  Legal: on probation  Insurance needs: active         Labs discussed with patient?  Yes      [place] Prescription Drug Monitoring Program Reviewed:  Yes  03/05/2025 03/05/2025 1 Suboxone 12 Mg-3 Mg Sl Film 30.00 30  [place] [id number] [place] (2659) 0/0 12.00 mg [place]   02/19/2025 02/18/2025 1 Suboxone 12 Mg-3 Mg Sl Film 30.00 15 [place] [id number]            Medications:  Current Outpatient Medications   Medication Sig Dispense Refill    Buprenorphine HCl-Naloxone HCl (SUBOXONE) 12-3 MG FILM per film Place 1 Film under the tongue daily. 14 Film 0    acetaminophen (TYLENOL) 500 MG tablet Take 500-1,000 mg by mouth every 8 hours as needed for mild pain.      alum & mag hydroxide-simethicone (MAALOX) 200-200-20 MG/5ML SUSP suspension Take 30 mLs by mouth every 6 hours as needed for indigestion.      benzocaine-menthol (CHLORASEPTIC) 6-10 MG  lozenge Place 1 lozenge inside cheek every 2 hours as needed for sore throat.      guaiFENesin (ROBITUSSIN) 20 mg/mL liquid Take 200 mg by mouth every 4 hours as needed for cough.      ibuprofen (ADVIL/MOTRIN) 200 MG tablet Take 600 mg by mouth every 6 hours as needed for pain.      loratadine (CLARITIN) 10 MG tablet Take 10 mg by mouth daily as needed for allergies.      melatonin 5 MG tablet Take 1 tablet (5 mg) by mouth nightly as needed for sleep. 30 tablet 0    naloxone (NARCAN) 4 MG/0.1ML nasal spray Spray 4 mg into one nostril alternating nostrils as needed for opioid reversal. every 2-3 minutes until assistance arrives      naloxone (NARCAN) 4 MG/0.1ML nasal spray Spray 1 spray (4 mg) into one nostril alternating nostrils once as needed for opioid reversal. every 2-3 minutes until assistance arrives 0.2 mL 11    nicotine polacrilex (NICORETTE) 4 MG gum 4 mg, buccal, every hour as needed for nicotine withdrawal symptoms. Chew until tingling, then place between cheek and gum. Repeat. Do not swallow. Not to exceed 96 mg (24 pieces) in a 24 hour period 200 each 1    ondansetron (ZOFRAN) 4 MG tablet Take 1 tablet (4 mg) by mouth every 8 hours as needed for nausea. 12 tablet 0    senna-docusate (SENOKOT-S/PERICOLACE) 8.6-50 MG tablet Take 2 tablets by mouth daily as needed for constipation. 60 tablet 1     No current facility-administered medications for this visit.     Facility-Administered Medications Ordered in Other Visits   Medication Dose Route Frequency Provider Last Rate Last Admin    Self Administer Medications: Behavioral Services   Does not apply See Admin Instructions Carly Hernandez MD           Allergies   Allergen Reactions    Peanut-Containing Drug Products Diarrhea     Denies true allergy       PMH, PSH, FamHx reviewed      OBJECTIVE                                                      /75   Pulse 60     Physical Exam  Cardiovascular:      Rate and Rhythm: Normal rate.   Pulmonary:       Effort: Pulmonary effort is normal. No respiratory distress.   Neurological:      General: No focal deficit present.      Mental Status: He is alert and oriented to person, place, and time.   Psychiatric:         Attention and Perception: Attention normal.         Mood and Affect: Mood normal.         Speech: Speech normal.         Behavior: Behavior is cooperative.         Thought Content: Thought content normal. Thought content does not include suicidal ideation.         Judgment: Judgment normal.         Labs:    UDS:    Lab Results   Component Value Date    BUP Screen Positive (A) 05/28/2025    BZO Negative 05/28/2025    BAR Negative 05/28/2025    CASIE Negative 05/28/2025    MAMP Negative 05/28/2025    AMP Negative 05/28/2025    MDMA Negative 05/28/2025    MTD Negative 05/28/2025    [id number] Negative 05/28/2025    OXY Negative 05/28/2025    PCP Negative 05/28/2025    THC Screen Positive (A) 05/28/2025    TEMP 94 F 05/28/2025    SGPOCT 1.015 05/28/2025     *POC urine drug screen does not screen for Fentanyl      Recent Results (from the past 240 hours)   Fentanyl, Qualitative, With Reflex to Quant Urine    Collection Time: 05/21/25  3:57 PM   Result Value Ref Range    Fentanyl Qual Urine Screen Negative Screen Negative   Drugs of Abuse Screen Urine (POC CUPS) POCT    Collection Time: 05/28/25 10:52 AM   Result Value Ref Range    POCT Kit Lot Number [id number]     POCT Kit Expiration Date 2859527     Temperature Urine POCT 94 F 90 F, 92 F, 94 F, 96 F, 98 F, 100 F    Specific [place] POCT 1.015 1.005, 1.015, 1.025    pH Qual Urine POCT 7 pH 4 pH, 5 pH, 7 pH, 9 pH    Creatinine Qual Urine POCT 100 mg/dL 20 mg/dL, 50 mg/dL, 100 mg/dL, 200 mg/dL    Internal QC Qual Urine POCT Valid Valid    Amphetamine Qual Urine POCT Negative Negative    Barbiturate Qual Urine POCT Negative Negative    Buprenorphine Qual Urine POCT Screen Positive (A) Negative    Benzodiazepine Qual Urine POCT Negative Negative    Cocaine Qual Urine POCT  Negative Negative    Methamphetamine Qual Urine POCT Negative Negative    MDMA Qual Urine POCT Negative Negative    Methadone Qual Urine POCT Negative Negative    Opiate Qual Urine POCT Negative Negative    Oxycodone Qual Urine POCT Negative Negative    Phencyclidine Qual Urine POCT Negative Negative    THC Qual Urine POCT Screen Positive (A) Negative   Fentanyl, Qualitative, With Reflex to Quant Urine    Collection Time: 05/28/25 11:09 AM   Result Value Ref Range    Fentanyl Qual Urine Screen Negative Screen Negative                Continued Complex Management  The longitudinal plan of care for Opioid Use Disorder (OUD) was addressed during this visit. Due to the added complexity in care, I will continue to support Wei in the subsequent management and with ongoing continuity of care.    Nadeen Luis, [place] [id number] [place] [id number]  [phone]

## 2025-05-28 NOTE — GROUP NOTE
Group Therapy Documentation    PATIENT'S NAME: Chacho Lucas  MRN:   [id number]  :   2005  ACCT. NUMBER: [id number]  DATE OF SERVICE: 25  START TIME:  8:30 AM  END TIME:  9:30 AM  FACILITATOR(S): Johnson Pierre LADC; Yaritza Payne LADC  TOPIC: BEH Group Therapy  Number of patients attending the group:  6  Group Length:  1 Hours    Dimensions addressed: 1, 2, 3, 4, 5, and 6    Summary of Group / Topics Discussed:    Recovery Principles    Group Attendance:  Attended group session    Patient's response to the group topic/interactions:  cooperative with task    Patient appeared to be Actively participating.        Client specific details:  Wei participated and interacted appropriately with peers and staff in skills group. No triggers to use noted or discussed.

## 2025-05-28 NOTE — GROUP NOTE
Group Therapy Documentation    PATIENT'S NAME: Chacho Lucas  MRN:   [id number]  :   2005  ACCT. NUMBER: [id number]  DATE OF SERVICE: 25  START TIME:  9:45 AM  END TIME: 11:15 AM  FACILITATOR(S): Jed Upton LADC  TOPIC: BEH Group Therapy  Number of patients attending the group:  6  Group Length:  1.5 Hours    Dimensions addressed: 2, 3, 4, and 5    Summary of Group / Topics Discussed:    Sober coping skills, Cognitive behavioral therapy skills, and Emotions/expression      Group began with check-ins, followed by reflection reading regarding mental rigidity. Group then included an exercise on re-framing thoughts, as well as a couple breathing exercises. Participants provided feedback throughout group session.     Group Attendance:  Attended group session    Patient's response to the group topic/interactions:  cooperative with task    Patient appeared to be Actively participating.        Client specific details:  Patient actively engaged in group discussion. Patient was excused for a portion of group to attend an appointment.     Suicide ideation: 0.         2025    11:00 AM   Suicide Ideation Check In   Since last session, how often have you had suicidal thoughts? No thoughts of suicide

## 2025-05-28 NOTE — GROUP NOTE
Group Therapy Documentation    PATIENT'S NAME: Chacho Lucas  MRN:   [id number]  :   2005  ACCT. NUMBER: [id number]  DATE OF SERVICE: 25  START TIME: 12:30 PM  END TIME:  2:30 PM  FACILITATOR(S): Yaritza Payne LADC; Wendy Darby  TOPIC: BEH Group Therapy  Number of patients attending the group:  6  Group Length:  2 Hours    Dimensions addressed: 3, 4, 5, and 6    Summary of Group / Topics Discussed:    Spiritual Care    Group Attendance:  Attended group session    Patient's response to the group topic/interactions:  cooperative with task    Patient appeared to be Actively participating, Attentive, and Engaged.        Client specific details: Wei was an active participant in spiritual care group session, led by Wendy Darby.

## 2025-05-28 NOTE — NURSING NOTE
[facility]      Rooming information:    Approximate last use of full opioid agonist: not since last visit  Taking buprenorphine? Yes: suboxone  How much per day? 12mg  Number of buprenorphine films/tablets remaining currently: 0  Side effects related to buprenorphine (constipation, dry mouth, sedation?)yes constipation  Narcan currently available: Yes  Other recent substance use:    Denies  NICOTINE-Yes: vape & cigs  If using nicotine, ready to quit? No but has gum    Point of care urine drug screen positive for:  Lab Results   Component Value Date    BUP Screen Positive (A) 05/28/2025    BZO Negative 05/28/2025    BAR Negative 05/28/2025    ACSIE Negative 05/28/2025    MAMP Negative 05/28/2025    AMP Negative 05/28/2025    MDMA Negative 05/28/2025    MTD Negative 05/28/2025    [id number] Negative 05/28/2025    OXY Negative 05/28/2025    PCP Negative 05/28/2025    THC Screen Positive (A) 05/28/2025    TEMP 94 F 05/28/2025    SGPOCT 1.015 05/28/2025       *POC urine drug screen does not screen for Fentanyl        Depression Response    Patient completed the PHQ-9 assessment for depression and scored >9? No  Question 9 on the PHQ-9 was positive for suicidality? No  Does patient have current mental health provider? yes  C-SSRS screener risk level.       Is this a virtual visit? No    I personally notified the following: N /A          5/28/2025    10:00 AM   PHQ Assesment Total Score(s)   PHQ-9 Score 0         Housing needs: [facility] 5/13/25    Insurance: [employer]    Current legal issues: yes    Contact information up to date?  yes    3rd Party Involvement no today (please obtain MARYCARMEN if pt would like to include)    Jen Manuel MA  May 28, 2025  10:42 AM

## 2025-05-29 ENCOUNTER — HOSPITAL ENCOUNTER (OUTPATIENT)
Dept: BEHAVIORAL HEALTH | Facility: CLINIC | Age: 20
End: 2025-05-29
Attending: FAMILY MEDICINE
Payer: COMMERCIAL

## 2025-05-29 LAB — FENTANYL UR QL: NORMAL

## 2025-05-29 PROCEDURE — 80307 DRUG TEST PRSMV CHEM ANLYZR: CPT | Performed by: FAMILY MEDICINE

## 2025-05-29 PROCEDURE — H2035 A/D TX PROGRAM, PER HOUR: HCPCS | Mod: HQ

## 2025-05-29 NOTE — PROGRESS NOTES
[facility] Individual Session Summary     Session Start Time: 11:11     Session End Time: 11:31     Duration: 20 minutes      DATA: Peer  met with Chacho Lucas in the Recovery Clinic to introduce and to provide patient further support and resources for their recovery.  Engaged in a discussion regarding where pt is at in terms of their recovery. Pt was  not engaged, distracted    Intervention: Facilitated an individual session, utilized active listening, provided validation, utilized motivational interviewing techniques, provided shared experience.    Assessment: Patient appeared anxious    Plan: Peer  will continue to provide support as needed. Provided patient with business card to pt encouraging pt to reach out to peer  if needed additional support and resources.        Patient Identified Goals  To continue to utilize their suboxone as prescribed., To attend and participate in a sober support group meeting., and To continue to take my other medications as prescribed.    Support Needs:   Ongoing care, support and resources for opioid substance use disorder as well as other substances.       Key Risk Factors to Recovery:   PRC encourages being aware of risk factors that can lead to re-use which include avoiding isolation, avoiding triggers and managing cravings in a healthy manner. being open and willing to acceptance and change on a daily basis.  PRC encourages pt to establish a sober network calling tree to reach out to when needed.  Continue to practice honesty with ourselves and trusted support person(s).   PRC encourages regular attendance at recovery based meetings as well as finding a sponsor for mentoring and accountability.   PRC encourages consideration of other services such as counseling for mental health issues which can correlate with our substance use.      Quinton Bautista  May 29, 2025  11:08 AM    Attestation: Donald Welander, LADC  Providers oversight and supervision of care.

## 2025-05-29 NOTE — GROUP NOTE
Group Therapy Documentation    PATIENT'S NAME: Chacho Lucas  MRN:   [id number]  :   2005  ACCT. NUMBER: [id number]  DATE OF SERVICE: 25  START TIME: 12:30 PM  END TIME:  2:30 PM  FACILITATOR(S): Jed Upton LADC  TOPIC: BEH Group Therapy  Number of patients attending the group:  6  Group Length:  2 Hours    Dimensions addressed: 3, 4, and 5    Summary of Group / Topics Discussed:    Disease of addiction and Emotions/expression    Group included discussions regarding personal interpretations of recovery and common myths about addiction. Participants provided feedback throughout group session.     Group Attendance:  Attended group session    Patient's response to the group topic/interactions:  cooperative with task    Patient appeared to be Actively participating.        Client specific details:  Patient actively engaged in group discussion.

## 2025-05-29 NOTE — GROUP NOTE
Group Therapy Documentation    PATIENT'S NAME: Cahcho Lucas  MRN:   [id number]  :   2005  ACCT. NUMBER: [id number]  DATE OF SERVICE: 25  START TIME:  3:00 PM  END TIME:  4:00 PM  FACILITATOR(S): Capri Robison LADC; Dolly Abarca LADC  TOPIC: BEH Group Therapy  Number of patients attending the group:  18  Group Length:  1 Hours    Dimensions addressed: 4, 5, and 6    Summary of Group / Topics Discussed:    Sober coping skills and Balanced lifestyle  Group Topic: Life Skills: Communication and Re Entering the Workforce.  Areas covered: Communication Skills, Resumes/CV, Job Interviews, Workforce Resources    Group Attendance:  Attended group session    Patient's response to the group topic/interactions:  cooperative with task    Patient appeared to be Engaged.        Client specific details:  Wei attended PM skills group and listened attentively throughout group session.

## 2025-05-29 NOTE — GROUP NOTE
Group Therapy Documentation    PATIENT'S NAME: Chacho Lucas  MRN:   [id number]  :   2005  ACCT. NUMBER: [id number]  DATE OF SERVICE: 25  START TIME:  9:00 AM  END TIME: 11:00 AM  FACILITATOR(S): Yaritza Payne LADC  TOPIC: BEH Group Therapy  Number of patients attending the group:  5  Group Length:  2 Hours    Dimensions addressed: 3, 4, 5, and 6    Summary of Group / Topics Discussed:    Recovery Principles, Disease of addiction, and Relapse prevention    Participants introduced a new member to the group. Then, participants listened to a peer share his Drug Use History and offered feedback.    Group Attendance:  Attended group session    Patient's response to the group topic/interactions:  cooperative with task    Patient appeared to be Actively participating, Attentive, and Engaged.        Client specific details: Wie was an active participant in morning group therapy session.     Suicide ideation: 0.         2025    12:00 PM   Suicide Ideation Check In   Since last session, how often have you had suicidal thoughts? No thoughts of suicide

## 2025-05-30 ENCOUNTER — HOSPITAL ENCOUNTER (OUTPATIENT)
Dept: BEHAVIORAL HEALTH | Facility: CLINIC | Age: 20
Discharge: HOME | End: 2025-05-30
Attending: FAMILY MEDICINE
Payer: COMMERCIAL

## 2025-05-30 PROCEDURE — 1002N00001 HC LODGING PLUS FACILITY CHARGE ADULT

## 2025-05-30 PROCEDURE — H2035 A/D TX PROGRAM, PER HOUR: HCPCS | Mod: HQ
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
## 2025-05-30 NOTE — GROUP NOTE
Group Therapy Documentation    PATIENT'S NAME: Chacho Lucas  MRN:   [id number]  :   2005  ACCT. NUMBER: [id number]  DATE OF SERVICE: 25  START TIME:  9:00 AM  END TIME: 11:00 AM  FACILITATOR(S): Jed Upton LADC  TOPIC: BEH Group Therapy  Number of patients attending the group:  7  Group Length:  2 Hours    Dimensions addressed: 3,4,5    Summary of Group / Topics Discussed:    Emotions/expression and Relapse prevention    Group began with check-ins, followed by a reflection reading regarding approaching new experiences. Group then discussed the topic of fixed mindset vs growth mindset. Feedback was provided by participants throughout group session.     Group Attendance:  Attended group session    Patient's response to the group topic/interactions:  cooperative with task    Patient appeared to be Actively participating.        Client specific details:  Patient actively engaged in group discussion.    Suicide ideation: 0.         2025    11:00 AM   Suicide Ideation Check In   Since last session, how often have you had suicidal thoughts? No thoughts of suicide

## 2025-05-31 ENCOUNTER — HOSPITAL ENCOUNTER (OUTPATIENT)
Dept: BEHAVIORAL HEALTH | Facility: CLINIC | Age: 20
Discharge: HOME | End: 2025-05-31
Attending: FAMILY MEDICINE
Payer: COMMERCIAL

## 2025-05-31 PROCEDURE — 1002N00001 HC LODGING PLUS FACILITY CHARGE ADULT

## 2025-05-31 PROCEDURE — H2035 A/D TX PROGRAM, PER HOUR: HCPCS | Mod: HQ

## 2025-05-31 NOTE — GROUP NOTE
Group Therapy Documentation    PATIENT'S NAME: Chacho Lucas  MRN:   [id number]  :   2005  ACCT. NUMBER: [id number]  DATE OF SERVICE: 25  START TIME:  9:00 AM  END TIME: 11:00 AM  FACILITATOR(S): Mac Troncoso LADC; Dolly Abarca LADC  TOPIC: BEH Group Therapy  Number of patients attending the group:  7  Group Length:  2 Hours    Dimensions addressed: 3, 4, and 5    Summary of Group / Topics Discussed:    Recovery Principles, Trauma informed care, Disease of addiction, Emotions/expression, and Relapse prevention    Group Attendance:  Attended group session    Patient's response to the group topic/interactions:  cooperative with task    Patient appeared to be Attentive and Engaged.        Client specific details:  The patent participated in the morning lecture which was about the [place] sinking and how that can relate to their addiction.

## 2025-05-31 NOTE — GROUP NOTE
Group Therapy Documentation    PATIENT'S NAME: Chacho Lucas  MRN:   [id number]  :   2005  ACCT. NUMBER: [id number]  DATE OF SERVICE: 25  START TIME: 12:30 PM  END TIME:  2:30 PM  FACILITATOR(S): Mac Troncoso LADC; Johnson Pierre LADC  TOPIC: BEH Group Therapy  Number of patients attending the group:  6  Group Length:  2 Hours    Dimensions addressed: 3, 4, and 5    Summary of Group / Topics Discussed:    Recovery Principles, Relationship/socialization, Balanced lifestyle, and Relapse prevention    Group Attendance:  Attended group session    Patient's response to the group topic/interactions:  cooperative with task    Patient appeared to be Attentive and Engaged.        Client specific details:  The patient participated in the afternoon lecture which was on relationships and boundaries.    Suicide ideation: 0.         2025     1:00 PM   Suicide Ideation Check In   Since last session, how often have you had suicidal thoughts? No thoughts of suicide

## 2025-06-01 ENCOUNTER — HOSPITAL ENCOUNTER (OUTPATIENT)
Dept: BEHAVIORAL HEALTH | Facility: CLINIC | Age: 20
Discharge: HOME | End: 2025-06-01
Attending: FAMILY MEDICINE
Payer: COMMERCIAL

## 2025-06-01 PROCEDURE — H2035 A/D TX PROGRAM, PER HOUR: HCPCS | Mod: HQ

## 2025-06-01 PROCEDURE — 1002N00001 HC LODGING PLUS FACILITY CHARGE ADULT

## 2025-06-01 NOTE — GROUP NOTE
Psychoeducation Group Documentation    PATIENT'S NAME: Chacho Lucas  MRN:   [id number]  :   2005  ACCT. NUMBER: [id number]  DATE OF SERVICE: 25  START TIME: 12:30 AM  END TIME:  1:30 PM  FACILITATOR(S): Zenia Buenrostro LADC; Roberta Montaño LADC  TOPIC: BEH Pyschoeducation  Number of patients attending the group:  19  Group Length:  1 Hours    Skills Group Therapy Type: Emotion regulation skills    Summary of Group / Topics Discussed:    Symptom management skills        Group Attendance:  Attended group session    Patient's response to the group topic/interactions:  cooperative with task    Patient appeared to be Attentive.         Client specific details:  Patient attended and participated in  lecture on Trauma.

## 2025-06-01 NOTE — GROUP NOTE
Psychoeducation Group Documentation    PATIENT'S NAME: Chacho Lucas  MRN:   [id number]  :   2005  ACCT. NUMBER: [id number]  DATE OF SERVICE: 25  START TIME:  8:30 AM  END TIME: 10:30 AM  FACILITATOR(S): Zee Gan RN; Zenia Buenrostro LADC; Roberta Montaño LADC  TOPIC: BEH Pyschoeducation  Number of patients attending the group:  19  Group Length:  2 Hours    Skills Group Therapy Type: Healthy behaviors development    Summary of Group / Topics Discussed:    Balanced lifestyle skills        Group Attendance:  Attended group session    Patient's response to the group topic/interactions:  cooperative with task    Patient appeared to be Engaged.         Client specific details:  Patient attended and participated in weekend Nurse' lecture on sexual transmitted disease.  Suicide ideation: 0.         2025    11:00 AM   Suicide Ideation Check In   Since last session, how often have you had suicidal thoughts? No thoughts of suicide

## 2025-06-02 ENCOUNTER — HOSPITAL ENCOUNTER (OUTPATIENT)
Dept: BEHAVIORAL HEALTH | Facility: CLINIC | Age: 20
Discharge: HOME | End: 2025-06-02
Attending: FAMILY MEDICINE
Payer: COMMERCIAL

## 2025-06-02 PROCEDURE — H2035 A/D TX PROGRAM, PER HOUR: HCPCS | Mod: HQ

## 2025-06-02 PROCEDURE — 1002N00001 HC LODGING PLUS FACILITY CHARGE ADULT

## 2025-06-02 NOTE — PROGRESS NOTES
[facility] Weekly Treatment Plan Review    Treatment plan review for the following date span:  5/26/2025-6/1/2025    ATTENDANCE  Patient had the following absences during this period:  5/28/2025 11:00 AM Length: 60  Nadeen Luis CNP Department:  [facility]      Weekly Treatment Plan Review     Treatment Plan initiated on: 5/18/2025.    Dimension1: Acute Intoxication/Withdrawal Potential -   Date of Last Use: 5/10/2025  Any reports of withdrawal symptoms - No      Dimension 2: Biomedical Conditions & Complications -   Medical Concerns:  None reported  Vitals:   BP Readings from Last 3 Encounters:   05/28/25 112/75   05/14/25 106/69   05/13/25 114/74     Pulse Readings from Last 3 Encounters:   05/28/25 60   05/14/25 72   05/13/25 98     Wt Readings from Last 3 Encounters:   05/13/25 59 kg (130 lb)   05/07/25 56.7 kg (125 lb)   03/05/25 66 kg (145 lb 9.6 oz)     Temp Readings from Last 3 Encounters:   05/13/25 98.1  F (36.7  C)      Current Medications & Medication Changes:  Current Outpatient Medications   Medication Sig Dispense Refill    acetaminophen (TYLENOL) 500 MG tablet Take 500-1,000 mg by mouth every 8 hours as needed for mild pain.      alum & mag hydroxide-simethicone (MAALOX) 200-200-20 MG/5ML SUSP suspension Take 30 mLs by mouth every 6 hours as needed for indigestion.      benzocaine-menthol (CHLORASEPTIC) 6-10 MG lozenge Place 1 lozenge inside cheek every 2 hours as needed for sore throat.      Buprenorphine HCl-Naloxone HCl (SUBOXONE) 12-3 MG FILM per film Place 1 Film under the tongue daily. 14 Film 0    guaiFENesin (ROBITUSSIN) 20 mg/mL liquid Take 200 mg by mouth every 4 hours as needed for cough.      ibuprofen (ADVIL/MOTRIN) 200 MG tablet Take 600 mg by mouth every 6 hours as needed for pain.      loratadine (CLARITIN) 10 MG tablet Take 10 mg by mouth daily as needed for allergies.      melatonin 5 MG tablet Take 1 tablet (5 mg) by mouth nightly as needed for sleep. 30 tablet  0    naloxone (NARCAN) 4 MG/0.1ML nasal spray Spray 4 mg into one nostril alternating nostrils as needed for opioid reversal. every 2-3 minutes until assistance arrives      naloxone (NARCAN) 4 MG/0.1ML nasal spray Spray 1 spray (4 mg) into one nostril alternating nostrils once as needed for opioid reversal. every 2-3 minutes until assistance arrives 0.2 mL 11    nicotine polacrilex (NICORETTE) 4 MG gum 4 mg, buccal, every hour as needed for nicotine withdrawal symptoms. Chew until tingling, then place between cheek and gum. Repeat. Do not swallow. Not to exceed 96 mg (24 pieces) in a 24 hour period 200 each 1    ondansetron (ZOFRAN) 4 MG tablet Take 1 tablet (4 mg) by mouth every 8 hours as needed for nausea. 12 tablet 0    senna-docusate (SENOKOT-S/PERICOLACE) 8.6-50 MG tablet Take 2 tablets by mouth daily as needed for constipation. 60 tablet 1     No current facility-administered medications for this encounter.     Facility-Administered Medications Ordered in Other Encounters   Medication Dose Route Frequency Provider Last Rate Last Admin    Self Administer Medications: Behavioral Services   Does not apply See Admin Instructions Carly Hernandez MD         Taking meds as prescribed? Yes  Medication side effects or concerns:  None reported  Outside medical appointments this week:   5/28/2025 11:00 AM Length: 60  Nadeen Luis CNP Department:  [facility]      Dimension 3: Emotional/Behavioral Conditions & Complications -   Mental health diagnosis: Anxiety, Depression, and ADHD    Date of last SIB:  No history.  Date of  last SI:  No history.  Date of last HI: No history.  Behavioral Targets:  Stabilize and maintain mental health.   Current MH Assignments:  Read the Tips on How to Focus with ADHD article, and discuss in group                                        PHQ2:       6/2/2025     1:00 PM 5/27/2025    10:00 AM 5/21/2025     1:00 PM 3/10/2025     2:00 PM 3/15/2022    11:00 AM 3/8/2022    10:00  "AM   PHQ-2 ( 1999 [employer])   Q1: Little interest or pleasure in doing things 0 0 0 0 1 0   Q2: Feeling down, depressed or hopeless 0 0 0 0 0 0   PHQ-2 Score 0 0 0 0 1 0      GAD2:       3/5/2025     8:12 AM 3/10/2025     2:00 PM 5/7/2025     2:57 PM 5/21/2025     1:00 PM 5/27/2025    10:00 AM 6/2/2025     1:00 PM   MERVAT-2   Feeling nervous, anxious, or on edge 3 0 0 0 0 0   Not being able to stop or control worrying 2 0 0 0 0 0   MERVAT-2 Total Score 5  0 0  0 0 0       Patient-reported       Additional Narrative:  Current Mental Health symptoms include: None reported at this time.    Active interventions to stabilize mental health symptoms this week : Patient attended group therapy, and attended an individual session with a staff therapist. Patient reported having no significant changes in his mood during this period. He reported no change to his stress level. Patient reported that he utilized working out (exercise) as a coping skill to help manage stress and difficult emotions.        Dimension 4: Treatment Acceptance / Resistance -   EVITA Diagnosis:  Opioid Use Disorder, Severe (F11.20)  Cannabis Use Disorder, Severe (F12.20)  Tobacco Use Disorder, Moderate (F17.20)  Commitment to tx process/Stage of change- Contemplation  EVITA assignments - Drug Use History Assignment                                  What is Motivation? Assignment      Additional Narrative - Patient participated appropriately in scheduled programming. He reported that he had gotten along well with staff and peers. Patient stated, \"Family,\" as his motivation to stay in treatment during this period.       Dimension 5: Relapse / Continued Problem Potential -   Relapses this week - None  Urges to use - None  UA results - 5/29/25 -- Positive for Bup, negative for all other screened drugs    Identified triggers - None   Coping skills identified - Patient stated, \"work out\".  Patient is able to utilize these skills when needed.    Additional Narrative- " Patient participated in relapse prevention related discussions and lectures. He attended spirituality group, emphasizing the importance of spiritual health in recovery.     Dimension 6: Recovery Environment -   Family Involvement - Patient reported having contact with mom, siblings, and grandparents.   Community support group attendance - Patient attends 2-3 sobriety support group meetings weekly while in programming.   Recreational activities - None      Additional Narrative - Patient has malachi spending quality time with peers. He reported interest in aftercare options.     Progress made on transition planning goals: Patient will  be given information continued care options for after treatment. No plans have yet been confirmed.     Justification for Continued Treatment at this Level of Care:  Patient was rated as a high risk for relapse upon admission, and will remain with a high risk rating throughout his time in programming.   Treatment coordination activities this week:  None  Need for peer recovery support referral? No    Discharge Planning:  Target Discharge Date/Timeframe:  6/10/2025   Med Mgmt Provider/Appt:  N/A   Ind therapy Provider/Appt:  5/27/2025   Other referrals:  N/A        Dimension Scale Review     Prior ratings: Dim1 - 0 DIM2 - 0 DIM3 - 2 DIM4 - 3 DIM5 - 4 DIM6 -3     Current ratings: Dim1 - 1 DIM2 - 0 DIM3 - 2 DIM4 - 3 DIM5 - 4 DIM6 -3       If client is 18 or older, has vulnerable adult status change? No    Are Treatment Plan goals/objectives effective? Yes  *If no, list changes to treatment plan:    Are the current goals meeting client's needs? Yes  *If no, list the changes to treatment plan.      *Client agrees with any changes to the treatment plan: N/A  *Client received copy of changes: No  *Client is aware of right to access a treatment plan review: Yes

## 2025-06-02 NOTE — GROUP NOTE
Group Therapy Documentation    PATIENT'S NAME: Chacho Lucas  MRN:   [id number]  :   2005  ACCT. NUMBER: [id number]  DATE OF SERVICE: 25  START TIME:  9:00 AM  END TIME: 11:00 AM  FACILITATOR(S): Jed Upton LADC  TOPIC: BEH Group Therapy  Number of patients attending the group:  6  Group Length:  2 Hours    Dimensions addressed: 3, 4, and 5    Summary of Group / Topics Discussed:    Sober coping skills, Relationship/socialization, and Emotions/expression    Group began with check-ins, followed by a reflection reading regarding the differences between men and women. Group then continued a discussion on how to develop and maintain a Growth Mindset. Participant provided feedback throughout group session.     Group Attendance:  Attended group session    Patient's response to the group topic/interactions:  cooperative with task    Patient appeared to be Actively participating.        Client specific details:  Patient actively engaged in group discussion.    Suicide ideation: 0.         2025    11:00 AM   Suicide Ideation Check In   Since last session, how often have you had suicidal thoughts? No thoughts of suicide

## 2025-06-02 NOTE — GROUP NOTE
Group Therapy Documentation    PATIENT'S NAME: Chacho Lucas  MRN:   [id number]  :   2005  ACCT. NUMBER: [id number]  DATE OF SERVICE: 25  START TIME: 12:30 PM  END TIME:  2:30 PM  FACILITATOR(S): Yaritza Payne LADC  TOPIC: BEH Group Therapy  Number of patients attending the group:  6  Group Length:  2 Hours    Dimensions addressed: 3, 4, 5, and 6    Summary of Group / Topics Discussed:    Recovery Principles, Relationship/socialization, Disease of addiction, and Emotions/expression    Patients participated in a weekly goal-setting exercise. They then shared and offered feedback on peer treatment plan assignments.    Group Attendance:  Attended group session    Patient's response to the group topic/interactions:  cooperative with task    Patient appeared to be Actively participating, Attentive, and Engaged.        Client specific details: Wei was an active participant in afternoon group therapy session.

## 2025-06-03 ENCOUNTER — HOSPITAL ENCOUNTER (OUTPATIENT)
Dept: BEHAVIORAL HEALTH | Facility: CLINIC | Age: 20
Discharge: HOME | End: 2025-06-03
Attending: FAMILY MEDICINE
Payer: COMMERCIAL

## 2025-06-03 PROCEDURE — H2035 A/D TX PROGRAM, PER HOUR: HCPCS | Mod: HQ

## 2025-06-03 PROCEDURE — H2035 A/D TX PROGRAM, PER HOUR: HCPCS | Performed by: MARRIAGE & FAMILY THERAPIST

## 2025-06-03 PROCEDURE — 1002N00001 HC LODGING PLUS FACILITY CHARGE ADULT

## 2025-06-03 NOTE — GROUP NOTE
Group Therapy Documentation    PATIENT'S NAME: Chacho Lucas  MRN:   [id number]  :   2005  ACCT. NUMBER: [id number]  DATE OF SERVICE: 25  START TIME:  3:00 PM  END TIME:  4:00 PM  FACILITATOR(S): Zenia Buenrostro LADC; Ashlee Bailey LADC  TOPIC: BEH Group Therapy  Number of patients attending the group:  18  Group Length:  1 Hours    Dimensions addressed: 2    Summary of Group / Topics Discussed:    Co-occurring illnesses symptom management    Group Attendance:  Attended group session    Patient's response to the group topic/interactions:  cooperative with task    Patient appeared to be Attentive and Engaged.        Client specific details: Pt listened respectfully to Dr. Pizarro's presentation on the field of EVITA research and asked appropriate questions.

## 2025-06-03 NOTE — PROGRESS NOTES
INDIVIDUAL SESSION SUMMARY    D) Met with client on 6/3/25 from 10:15-11:00am. Client is in the [facility] program. Client discussed his legal concerns, goals he has for the short -term to spend time with his family and long-term goals related to his education and career. Client discussed his intentions to attend AA/NA meetings, work with a sponsor and help out his mom and siblings when he returns home next week.    I)  Provided client with verbal interventions including validation, compassion, and support, Engaged in cognitive restructuring/re-framing by highlighting cognitive distortions and negative thought patterns, and Provided positive reinforcement for progress towards goals, gains in insight, and application of skills.    A)   Client appears to be gaining insights into their emotions and using healthy coping skills for managing stress, Client appears to understand the importance of a sober support network, and Client appears motivated to seek a new daily routine, meaningful activities, and a sense of purpose.    P) No future sessions scheduled.   This therapist has provided the client with several therapist referrals to contact in the community or the client will resume sessions with their therapist in the community.     Didi Luke, SARAVANAN, REGINALDOFT  6/3/2025

## 2025-06-03 NOTE — GROUP NOTE
Group Therapy Documentation    PATIENT'S NAME: Chacho Lucas  MRN:   [id number]  :   2005  ACCT. NUMBER: [id number]  DATE OF SERVICE: 25  START TIME:  9:00 AM  END TIME: 11:00 AM  FACILITATOR(S): Yaritza Payne LADC  TOPIC: BEH Group Therapy  Number of patients attending the group:  4  Group Length:  2 Hours    Dimensions addressed: 3, 4, 5, and 6    Summary of Group / Topics Discussed:    Recovery Principles, Disease of addiction, and Emotions/expression    Patients participated in 2 graduation ceremonies for peers. They then provided feedback to a peer who shared a treatment plan assignment. Group concluded with a discussion about grief and loss, trauma, and relationship repair as a method of healing.    Group Attendance:  Attended group session    Patient's response to the group topic/interactions:  cooperative with task    Patient appeared to be Actively participating, Attentive, and Engaged.        Client specific details: Wei was an active participant in morning group therapy session. He was excused for one hour for a therapy appointment.

## 2025-06-03 NOTE — GROUP NOTE
Group Therapy Documentation    PATIENT'S NAME: Chacho Lucas  MRN:   [id number]  :   2005  ACCT. NUMBER: [id number]  DATE OF SERVICE: 25  START TIME: 12:30 PM  END TIME:  2:30 PM  FACILITATOR(S): Zenia Buenrostro LADC  TOPIC: BEH Group Therapy  Number of patients attending the group:  5  Group Length:  2 Hours    Dimensions addressed: 4, 5, and 6    Summary of Group / Topics Discussed:    Recovery Principles, Balanced lifestyle, and Relapse prevention    Group Attendance:  Attended group session    Patient's response to the group topic/interactions:  cooperative with task    Patient appeared to be Actively participating, Attentive, and Engaged.        Client specific details: Pt took part in a group discussion about elements of control, compassion, relapse, and focusing on similarities vs differences.

## 2025-06-04 ENCOUNTER — HOSPITAL ENCOUNTER (OUTPATIENT)
Dept: BEHAVIORAL HEALTH | Facility: CLINIC | Age: 20
Discharge: HOME | End: 2025-06-04
Attending: FAMILY MEDICINE
Payer: COMMERCIAL

## 2025-06-04 DIAGNOSIS — Z51.81 ENCOUNTER FOR MONITORING OPIOID MAINTENANCE THERAPY: ICD-10-CM

## 2025-06-04 DIAGNOSIS — Z79.891 ENCOUNTER FOR MONITORING OPIOID MAINTENANCE THERAPY: ICD-10-CM

## 2025-06-04 LAB — FENTANYL UR QL: NORMAL

## 2025-06-04 PROCEDURE — 80307 DRUG TEST PRSMV CHEM ANLYZR: CPT

## 2025-06-04 PROCEDURE — H2035 A/D TX PROGRAM, PER HOUR: HCPCS | Mod: HQ

## 2025-06-04 PROCEDURE — 1002N00001 HC LODGING PLUS FACILITY CHARGE ADULT

## 2025-06-04 PROCEDURE — H2035 A/D TX PROGRAM, PER HOUR: HCPCS | Mod: HQ | Performed by: COUNSELOR

## 2025-06-04 NOTE — GROUP NOTE
Group Therapy Documentation    PATIENT'S NAME: Chacho Lucas  MRN:   [id number]  :   2005  ACCT. NUMBER: [id number]  DATE OF SERVICE: 25  START TIME: 12:30 PM  END TIME:  2:30 PM  FACILITATOR(S): Yaritza Payne LADC; Wendy Darby  TOPIC: BEH Group Therapy  Number of patients attending the group:  7  Group Length:  2 Hours    Dimensions addressed: 3, 4, 5, and 6    Summary of Group / Topics Discussed:    Spiritual Care    Group Attendance:  Attended group session    Patient's response to the group topic/interactions:  cooperative with task    Patient appeared to be Attentive and Engaged.        Client specific details: Wei was an active participant in spiritual care group session led by Wendy Darby.

## 2025-06-04 NOTE — GROUP NOTE
Group Therapy Documentation    PATIENT'S NAME: Chacho Lucas  MRN:   [id number]  :   2005  ACCT. NUMBER: [id number]  DATE OF SERVICE: 25  START TIME:  9:45 AM  END TIME: 11:30 AM  FACILITATOR(S): Yaritza Payne LADC  TOPIC: BEH Group Therapy  Number of patients attending the group:  7  Group Length:  2 Hours    Dimensions addressed: 3, 4, 5, and 6    Summary of Group / Topics Discussed:    Recovery Principles and Disease of addiction    Patients engaged in discussion on topics including: forgiveness, judgement, and group therapy norms.    Group Attendance:  Attended group session    Patient's response to the group topic/interactions:  cooperative with task    Patient appeared to be Actively participating, Attentive, and Engaged.        Client specific details: Wei was an active participant in morning group therapy session.

## 2025-06-04 NOTE — GROUP NOTE
Psychoeducation Group Documentation    PATIENT'S NAME: Chacho Lucas  MRN:   [id number]  :   2005  ACCT. NUMBER: [id number]  DATE OF SERVICE: 25  START TIME:  8:30 AM  END TIME:  9:30 AM  FACILITATOR(S): Eliana Lyons LADC; Dolly Abarca LADC  TOPIC: BEH Pyschoeducation  Number of patients attending the group:  19  Group Length:  1 Hours    Skills Group Therapy Type: Healthy behaviors development and Medication education    Summary of Group / Topics Discussed:    Symptom management skills and Medication management skills    Patients received an educational lecture about the impact of substance use on liver functioning.          Group Attendance:  Attended group session    Patient's response to the group topic/interactions:  listened actively    Patient appeared to be Attentive.         Client specific details:  Patient appeared to listen attentively to this lecture.       Right arm;

## 2025-06-05 ENCOUNTER — HOSPITAL ENCOUNTER (OUTPATIENT)
Dept: BEHAVIORAL HEALTH | Facility: CLINIC | Age: 20
End: 2025-06-05
Attending: FAMILY MEDICINE
Payer: COMMERCIAL

## 2025-06-05 PROCEDURE — H2035 A/D TX PROGRAM, PER HOUR: HCPCS | Mod: HQ

## 2025-06-05 PROCEDURE — 1002N00001 HC LODGING PLUS FACILITY CHARGE ADULT

## 2025-06-05 NOTE — GROUP NOTE
Group Therapy Documentation    PATIENT'S NAME: Chacho Lucas  MRN:   [id number]  :   2005  ACCT. NUMBER: [id number]  DATE OF SERVICE: 25  START TIME:  3:00 PM  END TIME:  4:00 PM  FACILITATOR(S): Johnson Pierre LADC; Mac Troncoso LADC  TOPIC: BEH Group Therapy  Number of patients attending the group:  6  Group Length:  1 Hours    Dimensions addressed: 1, 2, 3, 4, 5, and 6    Summary of Group / Topics Discussed:    Recovery Principles    Group Attendance:  Attended group session    Patient's response to the group topic/interactions:  cooperative with task    Patient appeared to be Actively participating.        Client specific details:  Wei participated and interacted appropriately with peers and staff in skills group. No triggers to use noted or discussed.

## 2025-06-05 NOTE — GROUP NOTE
Group Therapy Documentation    PATIENT'S NAME: Chacho Lucas  MRN:   [id number]  :   2005  ACCT. NUMBER: [id number]  DATE OF SERVICE: 25  START TIME: 12:30 PM  END TIME:  2:30 PM  FACILITATOR(S): Ashlee Bailey LADC  TOPIC: BEH Group Therapy  Number of patients attending the group:  6    Group Length:  2 Hours    Dimensions addressed: 3, 4, and 5    Summary of Group / Topics Discussed:    Relationship/socialization    Group Attendance:  Attended group session    Patient's response to the group topic/interactions:  cooperative with task    Patient appeared to be Actively participating, Attentive, and Engaged.        Client specific details:  Patient attended group outing and  discussion and was attentive and participative.

## 2025-06-05 NOTE — GROUP NOTE
Group Therapy Documentation    PATIENT'S NAME: Chacho Lucas  MRN:   [id number]  :   2005  ACCT. NUMBER: [id number]  DATE OF SERVICE: 25  START TIME:  9:00 AM  END TIME: 11:00 AM  FACILITATOR(S): Yaritza Payne LADC  TOPIC: BEH Group Therapy  Number of patients attending the group:  6  Group Length:  2 Hours    Dimensions addressed: 3, 4, 5, and 6    Summary of Group / Topics Discussed:    Recovery Principles and Disease of addiction    Patients participated in a peer graduation ceremony. They also engaged in a discussion on common issues and experiences in early recovery.    Group Attendance:  Attended group session    Patient's response to the group topic/interactions:  cooperative with task    Patient appeared to be Actively participating, Attentive, and Engaged.        Client specific details: Wei was an active participant in morning group therapy session.    Suicide ideation: 0.         2025    11:00 AM   Suicide Ideation Check In   Since last session, how often have you had suicidal thoughts? No thoughts of suicide

## 2025-06-06 ENCOUNTER — HOSPITAL ENCOUNTER (OUTPATIENT)
Dept: BEHAVIORAL HEALTH | Facility: CLINIC | Age: 20
Discharge: HOME | End: 2025-06-06
Attending: FAMILY MEDICINE
Payer: COMMERCIAL

## 2025-06-06 PROCEDURE — 1002N00001 HC LODGING PLUS FACILITY CHARGE ADULT

## 2025-06-06 PROCEDURE — H2035 A/D TX PROGRAM, PER HOUR: HCPCS | Mod: HQ

## 2025-06-06 NOTE — GROUP NOTE
Group Therapy Documentation    PATIENT'S NAME: Chacho Lucas  MRN:   [id number]  :   2005  ACCT. NUMBER: [id number]  DATE OF SERVICE: 25  START TIME: 12:30 PM  END TIME:  2:30 PM  FACILITATOR(S): Yaritza Payne LADC; Zenia Buenrostro LADC  TOPIC: BEH Group Therapy  Number of patients attending the group:  6  Group Length:  2 Hours    Dimensions addressed: 3, 4, 5, and 6    Summary of Group / Topics Discussed:    Recovery Principles, Emotions/expression, and Leisure explorations/use of leisure time    Patients viewed an addiction/mental health based film. This film addressed: healthy emotions, memories, and coping skills.  Patients engaged in a thorough discussion about the film, and shared personal experiences, and how the film helped them process their own life events.     Group Attendance:  Attended group session    Patient's response to the group topic/interactions:  cooperative with task    Patient appeared to be Attentive and Engaged.        Client specific details: Wei was an active participant in group session.

## 2025-06-06 NOTE — GROUP NOTE
Group Therapy Documentation    PATIENT'S NAME: Chacho Lucas  MRN:   [id number]  :   2005  ACCT. NUMBER: [id number]  DATE OF SERVICE: 25  START TIME:  9:00 AM  END TIME: 11:00 AM  FACILITATOR(S): Yaritza Payne LADC  TOPIC: BEH Group Therapy  Number of patients attending the group:  6  Group Length:  2 Hours    Dimensions addressed: 3, 4, 5, and 6    Summary of Group / Topics Discussed:    Recovery Principles, Disease of addiction, and Emotions/expression    Patients participated in a group discussion on the power of language and how our word choice can impact our mood and attitude. Patients also engaged in a discussion on communication skills.    Group Attendance:  Attended group session    Patient's response to the group topic/interactions:  cooperative with task    Patient appeared to be Actively participating, Attentive, and Engaged.        Client specific details: Wei was an active participant in morning group therapy session.    Suicide ideation: 0.         2025    11:00 AM   Suicide Ideation Check In   Since last session, how often have you had suicidal thoughts? No thoughts of suicide

## 2025-06-07 ENCOUNTER — HOSPITAL ENCOUNTER (OUTPATIENT)
Dept: BEHAVIORAL HEALTH | Facility: CLINIC | Age: 20
Discharge: HOME | End: 2025-06-07
Attending: FAMILY MEDICINE
Payer: COMMERCIAL

## 2025-06-07 PROCEDURE — 1002N00001 HC LODGING PLUS FACILITY CHARGE ADULT

## 2025-06-07 PROCEDURE — H2035 A/D TX PROGRAM, PER HOUR: HCPCS | Mod: HQ

## 2025-06-07 NOTE — GROUP NOTE
Psychoeducation Group Documentation    PATIENT'S NAME: Chacho Lucas  MRN:   [id number]  :   2005  ACCT. NUMBER: [id number]  DATE OF SERVICE: 25  START TIME: 12:30 PM  END TIME:  2:30 PM  FACILITATOR(S): Sidney Liu LADC; Zenia Buenrostro LADC  TOPIC: BEH Pyschoeducation  Number of patients attending the group:  21  Group Length:  2 Hours    Skills Group Therapy Type: Relationship skills development    Summary of Group / Topics Discussed:    Relationship/social skills        Group Attendance:  Attended group session    Patient's response to the group topic/interactions:  cooperative with task and listened actively    Patient appeared to be Attentive and Engaged.         Client specific details:  Pt was attentive and engaged with the speaker.

## 2025-06-07 NOTE — GROUP NOTE
Psychoeducation Group Documentation    PATIENT'S NAME: Chacho Lucas  MRN:   [id number]  :   2005  ACCT. NUMBER: [id number]  DATE OF SERVICE: 25  START TIME:  9:00 AM  END TIME: 11:00 AM  FACILITATOR(S): Sidney Liu LADC; Mac Troncoso LADC  TOPIC: BEH Pyschoeducation  Number of patients attending the group:  21  Group Length:  2 Hours    Skills Group Therapy Type: Recovery skills and Relationship skills development    Summary of Group / Topics Discussed:    Relationship/social skills and Relapse prevention skills        Group Attendance:  Attended group session    Patient's response to the group topic/interactions:  cooperative with task and listened actively    Patient appeared to be Attentive and Engaged.         Client specific details:  Pt was attentive and engaged with the speaker.        2025    11:00 AM   Suicide Ideation Check In   Since last session, how often have you had suicidal thoughts? No thoughts of suicide

## 2025-06-08 ENCOUNTER — HOSPITAL ENCOUNTER (OUTPATIENT)
Dept: BEHAVIORAL HEALTH | Facility: CLINIC | Age: 20
Discharge: HOME | End: 2025-06-08
Attending: FAMILY MEDICINE
Payer: COMMERCIAL

## 2025-06-08 PROCEDURE — H2035 A/D TX PROGRAM, PER HOUR: HCPCS | Mod: HQ

## 2025-06-08 PROCEDURE — 1002N00001 HC LODGING PLUS FACILITY CHARGE ADULT

## 2025-06-08 NOTE — GROUP NOTE
Psychoeducation Group Documentation    PATIENT'S NAME: Chacho Lucas  MRN:   [id number]  :   2005  ACCT. NUMBER: [id number]  DATE OF SERVICE: 25  START TIME:  8:45 AM  END TIME: 10:45 AM  FACILITATOR(S): Mac Troncoso LADC; Randi Delgado RN  TOPIC: BEH Pyschoeducation  Number of patients attending the group:  6  Group Length:  2 Hours    Skills Group Therapy Type: Healthy behaviors development    Summary of Group / Topics Discussed:    Balanced lifestyle skills and Medication management skills        Group Attendance:  Attended group session    Patient's response to the group topic/interactions:  cooperative with task    Patient appeared to be Attentive and Engaged.         Client specific details:  The patient participated in the morning nursing lecture on HIV/AIDS.    Suicide ideation: 0.         2025     1:00 PM   Suicide Ideation Check In   Since last session, how often have you had suicidal thoughts? No thoughts of suicide

## 2025-06-08 NOTE — GROUP NOTE
Psychoeducation Group Documentation    PATIENT'S NAME: Chacho Lucas  MRN:   [id number]  :   2005  ACCT. NUMBER: [id number]  DATE OF SERVICE: 25  START TIME: 12:30 PM  END TIME:  1:30 PM  FACILITATOR(S): Sidney Liu LADC  TOPIC: BEH Pyschoeducation  Number of patients attending the group:  20  Group Length:  1 Hours    Skills Group Therapy Type: Emotion regulation skills    Summary of Group / Topics Discussed:    Mindfulness/Relaxation skills        Group Attendance:  Attended group session    Patient's response to the group topic/interactions:  cooperative with task and listened actively    Patient appeared to be Attentive and Engaged.         Client specific details:  Pt was attentive and engaged with speaker.

## 2025-06-09 ENCOUNTER — HOSPITAL ENCOUNTER (OUTPATIENT)
Dept: BEHAVIORAL HEALTH | Facility: CLINIC | Age: 20
Discharge: HOME | End: 2025-06-09
Attending: FAMILY MEDICINE
Payer: COMMERCIAL

## 2025-06-09 PROCEDURE — H2035 A/D TX PROGRAM, PER HOUR: HCPCS | Mod: HQ

## 2025-06-09 PROCEDURE — 1002N00001 HC LODGING PLUS FACILITY CHARGE ADULT

## 2025-06-09 NOTE — PROGRESS NOTES
[address] and [address]oors  [place] [address]              Chacho Lucas, 2005 : was admitted for evaluation/treatment of chemical dependency at [facility].  He took part in these program(s):     ______ The Inpatient Program   ______ The Outpatient Program   ___X___ [facility]   ______ [facility] Out[facility]         Date admitted: 5/13/25    Date discharged: 6/10/25     Type of discharge:     ___X__ Satisfactory - completed evaluation / treatment   ______ Discharged without completing   ______ Behavioral discharge   ______ Transferred to another chemical dependency program   ______ Transferred to another type of service   ______ Left against medical advice (AMA) / Eloped               Clinicians: ANGIE He & ANGIE Diaz, [facility]     Date: 6/9/25           Time: 2:30pm

## 2025-06-09 NOTE — PROGRESS NOTES
[facility]   Client Name: Chacho Lucas MR#:  [id number]  YOB: 2005        Age: 19 years old Sex: Male    Focus of Treatment / Discharge Recommendations  Personal Safety/ Management of Symptoms  * Follow your safety plan.  Report increased symptoms to your care team and /or go to the nearest Emergency Department.  * Call crisis lines as needed    [facility] [phone]                Crisis Connection [phone]  [facility] [phone]               [facility] [phone]  [facility] [phone]           National Suicide Prevention [phone] OR Text/Call 257  [facility] [phone]             Suicide Prevention [phone]     [place] [facility]: [phone]  [place] [facility]: [phone]  [place] [facility]: [phone]    Abstinence/Relapse Prevention  * Take all medicines as directed.  Carry a current list of medicines with you.  * Use coping skills: nutrition, rest, exercise, spirituality, journal, meditation, engage in activities you enjoy, and sober support resources.   * Remain abstinent from alcohol and non-prescribed, mood altering substances.     Develop/Improve Independent Living/Socialization Skills: Ensure living environment is conducive to sobriety.     Community Resources/Supports and Discharge Planning:    Follow up with medical appointments as needed and as given in nursing discharge instructions.    Attend 2-3 sober support meetings weekly and work with a sponsor.    Client Signature:_______________________   Date / Time:___________    Staff Signature:________________________   Date / Time:___________

## 2025-06-09 NOTE — GROUP NOTE
Group Therapy Documentation    PATIENT'S NAME: Chacho Lucas  MRN:   [id number]  :   2005  ACCT. NUMBER: [id number]  DATE OF SERVICE: 25  START TIME:  9:00 AM  END TIME: 11:00 AM  FACILITATOR(S): Yaritza Payne LADC  TOPIC: BEH Group Therapy  Number of patients attending the group:  5  Group Length:  2 Hours    Dimensions addressed: 3, 4, 5, and 6    Summary of Group / Topics Discussed:    Sober coping skills, Disease of addiction, Emotions/expression, and Relapse prevention    Participants engaged in a group discussion on their Highs and Lows from the weekend as well as avoidance as a barrier to recovery. They then engaged in a goal-setting exercise, followed by a peer graduation ceremony.    Group Attendance:  Attended group session    Patient's response to the group topic/interactions:  cooperative with task    Patient appeared to be Actively participating, Attentive, and Engaged.        Client specific details: Wei was an active participant in morning group therapy session.    Suicide ideation: 0.         2025    11:00 AM   Suicide Ideation Check In   Since last session, how often have you had suicidal thoughts? No thoughts of suicide

## 2025-06-09 NOTE — PROGRESS NOTES
Nursing Discharge Planning Meeting    Writer completed discharge planning meeting with patient. Discharge is planned for 6/10/2025.    Discussed appropriate follow up care to manage physical and EVITA health and to obtain medication refills. Patient given a copy of their current medications for reference. Questions were answered at this time and the patient verbalized an understanding of the post-discharge follow up plan.    Patient will f/u with providers below as recommended and/or is aware of upcoming appointments:    [facility]  [address] [place] [address], [address] [facility] [address] [phone]      [facility]   [facility], [address]   [place], [place], [address]  Phone: [phone]  Fax: [phone]  Appt date and time: 6/10/2025 @ 9AM  Provider: Jennifer Smallwood    Continue to support patient in discharge planning as needed to assure appropriate continuity of care.     Tobacco Cessation  Patient participated in the nicotine replacement therapy for tobacco cessation or reduction during their treatment programming: Yes    The patient was provided with community resources for follow-up to continue tobacco cessation support once in the community. Also the patient was encouraged to discuss their tobacco cessation efforts with the primary care provider.    [facility] Nurse reviewed with pt all medications and instructions insuring that pt has appropriate refills at discharge and knowledge of upcoming appt: Yes     [facility]  Nurse Liaison / CD [facility]  O: [phone]  Fax:[phone]  [facility] [id number]  M-F: 7AM to 5:30PM   Sat-Sun: 7AM to 3PM  After hours: [phone]

## 2025-06-10 NOTE — ADDENDUM NOTE
Encounter addended by: Yaritza Payne LADC on: 6/10/2025 12:03 PM   Actions taken: Clinical Note Signed

## 2025-06-10 NOTE — PROGRESS NOTES
"COUNSELOR S DISCHARGE SUMMARY    Date: 6/10/2025    Program Name:  [facility]    Client Name: Chacho Lucas YOB: 2005      MR#:  [id number]    Referred by: Self       Release copies to: N/A      Admit date: 5/13/25  Discharge Date:  6/10/25    # of Days Attended: 28  Date Last Attended: 6/9/25     Discharge Status:  With Staff Approval    PROBLEMS PRESENTED AT ADMISSION:  (Include reasons & circumstances for admission)  Chacho Lucas is a 19 year old year old male who presented for a EVITA assessment on 5/7/25, seeking admission to . From this assessment: \"The patient had a prior substance use disorder assessment with this counselor at [facility] on 2/20/2025. Following that assessment in 2/2025, the patient had entered the [facility] treatment program at [facility] in [place], [place] for treatment on 2/27/2025. While in the [facility] treatment program at [facility] the patient had frequently been tardy to the lectures and group sessions and he had missed lectures and group sessions, so the patient had been Administratively Discharged from the [facility] treatment program on 3/11/2025 without a completion. The patient reported he had been able to abstain from Fentanyl powder since 2/2025, largely because he had continued to take his prescribed Suboxone for Medication Assisted Therapy as prescribed by his medical providers at the [facility], but he had returned to vaping THC/cannabis on a daily basis and he had consumed alcohol on a few occasions since being discharged from the [facility] treatment program on 3/11/2025. The patient had requested a referral to enter the [facility] program at [facility] in [place], [place] for substance use disorder treatment. The patient had been informed he would likely be put on a behavioral contract if he were readmitted to the [facility] treatment program and " "he was informed he would need to attend all of the required lectures and group sessions while in the [facility] treatment program.\"      Admitting diagnosis:   Opioid Use Disorder, Severe (F11.20)  Cannabis Use Disorder, Severe (F12.20)      PROGRAM PARTICIPATION:  While at [facility], Chacho Lucas received the following services to address substance use and mental health disorders.  he participated in:  Group Therapy, Individual Therapy, Psychiatric Medication Management, Recreation Activities, Spirituality Groups, DBT Skills Groups, AA/NA meetings , Life Skills Groups, and Psych-education Groups    READMITTANCE INFORMATION: If additional substance use treatment is required, patient may re-admit into the [facility] program following 30 days from date of discharge.    PROGRESS:     Dimension 1 - Acute Intoxication/Withdrawal Potential:  Admission ASAM Risk Ratin Client can tolerate and cope with withdrawal discomfort. The client displays mild to moderate intoxication or signs and symptoms interfering with daily functioning but does not immediately endanger self or others. Client poses minimal risk of severe withdrawal.  Discharge ASAM Risk Ratin Client can tolerate and cope with withdrawal discomfort. The client displays mild to moderate intoxication or signs and symptoms interfering with daily functioning but does not immediately endanger self or others. Client poses minimal risk of severe withdrawal.    Treatment goal(s):  Self-monitor for withdrawal discomfort.     Progress toward goal(s):  Patient reports last use date was 5/10/25. Patient is currently on Suboxone for MAT. Patient was able to tolerate mild withdrawal symptoms throughout treatment and participate in programming. No concerns at time of discharge.     Dimension 2 - Biomedical Conditions & Complications:  Admission ASAM Risk Ratin Client displays full functioning with good ability to cope with physical " discomfort.  Discharge ASAM Risk Ratin Client displays full functioning with good ability to cope with physical discomfort.    Treatment goal(s):  Self-monitor physical health. Take medications as prescribed.     Progress toward goal(s): Patient denied any biomedical concerns. Patient has a primary care provider at [facility] and has follow-up appointments scheduled for after discharge. Patient maintained medication compliance and appears able to access medical aid independently.        Dimension 3 - Emotional/Behavioral Conditions & Complications:  Admission ASAM Risk Ratin Client has difficulty with impulse control and lacks coping skills. Client has thoughts of suicide or harm to others without means; however, the thoughts may interfere with participation in some treatment activities. Client has difficulty functioning in significant life areas. Client has moderate symptoms of emotional, behavioral, or cognitive problems. Client is able to participate in most treatment activities.  Discharge ASAM Risk Ratin Client has difficulty with impulse control and lacks coping skills. Client has thoughts of suicide or harm to others without means; however, the thoughts may interfere with participation in some treatment activities. Client has difficulty functioning in significant life areas. Client has moderate symptoms of emotional, behavioral, or cognitive problems. Client is able to participate in most treatment activities.    Treatment goal(s):  Gain a better understanding of the relationship between substance abuse and mental health. Utilize available support and resources.     Progress toward goal(s):  Patient reported a mental health diagnosis of anxiety, depression, and ADHD.  Upon admission, patient's PHQ-9 initial score was 0/27, indicating no depression.  Patient's MERVAT-7 initial score was 0/21, indicating no anxiety. To accomplish his above stated goals, patient completed the assignments  "\"Addiction vs. Mental Health\" and \"Tips on How to Focus with ADHD\". He also engaged in individual therapy. Patient participated in a suicide risk screening as part of the CD assessment and was given a rating of Very Low Risk.  Patient completed a safety plan upon entering the [facility]. Upon discharge, patient denied any suicidal thoughts or ideations.      Dimension 4 - Treatment Acceptance/Resistance:  Admission ASAM Risk Rating: 3 Client displays inconsistent compliance, minimal awareness of either the client's addiction or mental disorder, and is minimally cooperative.  Discharge ASAM Risk Ratin Client displays verbal compliance, but lacks consistent behaviors; has low motivation for change; and is passively involved in treatment.    Treatment goal(s):  Acknowledge the effects that substance use has had on himself and the people in his life.       Progress toward goal(s):  Upon admission, patient had limited insight or acceptance of the first step and how his substance use has affected his life. Patient initially had minimal willingness to engage in recovery process and seemed to have primarily external motivation. This improved while at , so his risk rating has been lowered from a 3 to a 2. To accomplish his above stated goals, patient completed the assignments \"First Step\", \"Drug Use History\" and \"What is Motivation\". He appears to be in the contemplation stage of change at this time.      Dimension 5 - Relapse/Continued Problem Potential:  Admission ASAM Risk Ratin No awareness of the negative impact of mental health problems or substance abuse. No coping skills to arrest mental health or addiction illnesses, or prevent relapse.  Discharge ASAM Risk Rating: 3 Client has poor recognition and understanding of relapse and recidivism issues and displays moderately high vulnerability for further substance use or mental health problems. Client has few coping skills and rarely applies " "coping skills.    Treatment goal(s):  Gain a better understanding of personal triggers and pattern of self-sabotage.     Progress toward goal(s):  Upon admission, patient had limited insight into his personal relapse process, triggers, and warning signs. He had minimal periods of sobriety in the past. To accomplish his above stated goals, patient completed the assignments \"Jay's Theory\" and \"Relapse Prevention Planning\". Patient was able to verbalize sober coping skills and relapse prevention strategies prior to discharge. For this reason, patient's risk rating in this dimension has been lowered from a \"4\" to a \"3\".    Dimension 6 - Recovery Environment: (family, recreation, legal, education, etc.)  Admission ASAM Risk Rating: 3 Client is not engaged in structured, meaningful activity and the client's peers, family, significant other, and living environment are unsupportive, or there is significant criminal justice system involvement.  Discharge ASAM Risk Rating: 3 Client is not engaged in structured, meaningful activity and the client's peers, family, significant other, and living environment are unsupportive, or there is significant criminal justice system involvement.    Treatment goal(s): Develop aftercare plans. Utilize available support and resources.       Progress toward goal(s):  Patient developed healthy relationships with his peers while in treatment and participated in nightly sober support meetings. Prior to discharge, patient identified supportive resources in the community that include weekly 12-step meetings and sponsorship. He was encouraged to seek IOP and sober living after graduation, however he declined.      Client strengths identified during treatment were: Positive, willing, honest, and motivated      Client needs identified during treatment were: Legal concerns, poor follow-through on care recommendations    Discharge Diagnosis:    Opioid Use Disorder, Severe (F11.20)  Cannabis Use " Disorder, Severe (F12.20)    Discharge Medications:   Current Outpatient Medications   Medication Sig Dispense Refill    Buprenorphine HCl-Naloxone HCl (SUBOXONE) 12-3 MG FILM per film Place 1 Film under the tongue daily. 14 Film 0    melatonin 5 MG tablet Take 1 tablet (5 mg) by mouth nightly as needed for sleep. 30 tablet 0    naloxone (NARCAN) 4 MG/0.1ML nasal spray Spray 4 mg into one nostril alternating nostrils as needed for opioid reversal. every 2-3 minutes until assistance arrives      naloxone (NARCAN) 4 MG/0.1ML nasal spray Spray 1 spray (4 mg) into one nostril alternating nostrils once as needed for opioid reversal. every 2-3 minutes until assistance arrives 0.2 mL 11    nicotine polacrilex (NICORETTE) 4 MG gum 4 mg, buccal, every hour as needed for nicotine withdrawal symptoms. Chew until tingling, then place between cheek and gum. Repeat. Do not swallow. Not to exceed 96 mg (24 pieces) in a 24 hour period 200 each 1    ondansetron (ZOFRAN) 4 MG tablet Take 1 tablet (4 mg) by mouth every 8 hours as needed for nausea. 12 tablet 0    senna-docusate (SENOKOT-S/PERICOLACE) 8.6-50 MG tablet Take 2 tablets by mouth daily as needed for constipation. 60 tablet 1     No current facility-administered medications for this encounter.     Facility-Administered Medications Ordered in Other Encounters   Medication Dose Route Frequency Provider Last Rate Last Admin    Self Administer Medications: Behavioral Services   Does not apply See Admin Instructions Carly Hernandez MD           Discharge Plan and Recommendations:    Living arrangements at discharge: Patient reported he would be returning to live with his mother.  Patient terminated services due to program completion. The following continued care recommendations have been made: Patient was recommended to attend an IOP with sober living after completion of [facility]; however, patient declined referrals for any formal services and indicated he would be attending  12-step meetings.    1.  Abstain from all mood-altering chemicals.   2.  Attend a minimum of three 12-step meetings per week.   3.  Obtain a sponsor and maintain regular contact with him.   4.  Monitor and comply with advice of doctors regarding physical health issues and take all medications as prescribed.   5.  Engage in individual therapy to continue addressing mental health concerns.  6.  Continue to invest in building a sober support network.   7.  Continue to monitor and gain understanding of relapse triggers and stressors through the use of development of healthy coping skills.     Prognosis:  Fair        Staff Signature: ANGIE Diaz, LPCC

## 2025-06-10 NOTE — ADDENDUM NOTE
Encounter addended by: Yaritza Payne LADC on: 6/10/2025 9:49 AM   Actions taken: Charge Capture section accepted

## 2025-06-26 ENCOUNTER — OFFICE VISIT (OUTPATIENT)
Dept: BEHAVIORAL HEALTH | Facility: CLINIC | Age: 20
End: 2025-06-26
Payer: COMMERCIAL

## 2025-06-26 VITALS — OXYGEN SATURATION: 99 % | DIASTOLIC BLOOD PRESSURE: 57 MMHG | SYSTOLIC BLOOD PRESSURE: 93 MMHG | HEART RATE: 46 BPM

## 2025-06-26 DIAGNOSIS — Z72.51 HISTORY OF UNPROTECTED SEX: ICD-10-CM

## 2025-06-26 DIAGNOSIS — F11.20 OPIOID USE DISORDER, SEVERE, DEPENDENCE (H): Primary | ICD-10-CM

## 2025-06-26 DIAGNOSIS — Z86.19 HISTORY OF SYPHILIS: ICD-10-CM

## 2025-06-26 DIAGNOSIS — Z79.891 ENCOUNTER FOR MONITORING OPIOID MAINTENANCE THERAPY: ICD-10-CM

## 2025-06-26 DIAGNOSIS — F17.200 NICOTINE USE DISORDER: ICD-10-CM

## 2025-06-26 DIAGNOSIS — Z51.81 ENCOUNTER FOR MONITORING OPIOID MAINTENANCE THERAPY: ICD-10-CM

## 2025-06-26 LAB
AMPHETAMINE QUAL URINE POCT: NEGATIVE
BARBITURATE QUAL URINE POCT: NEGATIVE
BENZODIAZEPINE QUAL URINE POCT: NEGATIVE
BUPRENORPHINE QUAL URINE POCT: ABNORMAL
COCAINE QUAL URINE POCT: NEGATIVE
CREATININE QUAL URINE POCT: ABNORMAL
INTERNAL QC QUAL URINE POCT: ABNORMAL
MDMA QUAL URINE POCT: NEGATIVE
METHADONE QUAL URINE POCT: NEGATIVE
METHAMPHETAMINE QUAL URINE POCT: NEGATIVE
OPIATE QUAL URINE POCT: NEGATIVE
OXYCODONE QUAL URINE POCT: NEGATIVE
PH QUAL URINE POCT: ABNORMAL
PHENCYCLIDINE QUAL URINE POCT: NEGATIVE
POCT KIT EXPIRATION DATE: ABNORMAL
POCT KIT LOT NUMBER: ABNORMAL
SPECIFIC GRAVITY POCT: 1.01
TEMPERATURE URINE POCT: ABNORMAL
THC QUAL URINE POCT: ABNORMAL

## 2025-06-26 RX ORDER — DIPHENHYDRAMINE HYDROCHLORIDE 50 MG/ML
50 INJECTION, SOLUTION INTRAMUSCULAR; INTRAVENOUS
Start: 2025-06-26

## 2025-06-26 RX ORDER — ALBUTEROL SULFATE 90 UG/1
1-2 INHALANT RESPIRATORY (INHALATION)
Start: 2025-06-26

## 2025-06-26 RX ORDER — POLYETHYLENE GLYCOL 3350 17 G/17G
17 POWDER, FOR SOLUTION ORAL DAILY PRN
Qty: 510 G | Refills: 11 | Status: SHIPPED | OUTPATIENT
Start: 2025-06-26

## 2025-06-26 RX ORDER — LIDOCAINE HYDROCHLORIDE 10 MG/ML
2 INJECTION, SOLUTION EPIDURAL; INFILTRATION; INTRACAUDAL; PERINEURAL ONCE
OUTPATIENT
Start: 2025-06-26 | End: 2025-06-26

## 2025-06-26 RX ORDER — METHYLPREDNISOLONE SODIUM SUCCINATE 40 MG/ML
40 INJECTION INTRAMUSCULAR; INTRAVENOUS
Start: 2025-06-26

## 2025-06-26 RX ORDER — EPINEPHRINE 1 MG/ML
0.3 INJECTION, SOLUTION, CONCENTRATE INTRAVENOUS EVERY 5 MIN PRN
OUTPATIENT
Start: 2025-06-26

## 2025-06-26 RX ORDER — DIPHENHYDRAMINE HYDROCHLORIDE 50 MG/ML
25 INJECTION, SOLUTION INTRAMUSCULAR; INTRAVENOUS
Start: 2025-06-26

## 2025-06-26 RX ORDER — BUPRENORPHINE AND NALOXONE 12; 3 MG/1; MG/1
1 FILM, SOLUBLE BUCCAL; SUBLINGUAL DAILY
Qty: 30 FILM | Refills: 0 | Status: SHIPPED | OUTPATIENT
Start: 2025-06-26

## 2025-06-26 RX ORDER — MEPERIDINE HYDROCHLORIDE 25 MG/ML
25 INJECTION INTRAMUSCULAR; INTRAVENOUS; SUBCUTANEOUS
OUTPATIENT
Start: 2025-06-26

## 2025-06-26 RX ORDER — ALBUTEROL SULFATE 0.83 MG/ML
2.5 SOLUTION RESPIRATORY (INHALATION)
OUTPATIENT
Start: 2025-06-26

## 2025-06-26 ASSESSMENT — PATIENT HEALTH QUESTIONNAIRE - PHQ9: SUM OF ALL RESPONSES TO PHQ QUESTIONS 1-9: 0

## 2025-06-26 NOTE — PROGRESS NOTES
[facility] Follow Up    ASSESSMENT/PLAN                                                    1. Opioid use disorder, severe, dependence (H) (Primary)  Reporting control of symptoms.  Interested in transfer to [facility].   Sublocade #1 tentatively scheduled for 7/10/25.    Continue SL buprenorphine 12mg/day until Sublocade initiated, then discontinue.    Miralax prn OIC  Encouraged recovery activities  Encouraged long term engagement in treatment  - Urine Drug Screen  - Drugs of Abuse Screen Urine (POC CUPS) POCT  - polyethylene glycol (MIRALAX) 17 GM/Dose powder; Take 17 g by mouth daily as needed for constipation.  Dispense: 510 g; Refill: 11  - Buprenorphine HCl-Naloxone HCl (SUBOXONE) 12-3 MG FILM per film; Place 1 Film under the tongue daily.  Dispense: 30 Film; Refill: 0    2. Encounter for monitoring opioid maintenance therapy  - Urine Drug Screen  - Drugs of Abuse Screen Urine (POC CUPS) POCT    3. History of unprotected sex  Pt requesting repeat testing.  He did not go to lab, follow-up next visit.  Counseled on consistent condom use.   - HIV Antigen Antibody Combo; Future  - Hepatitis C Screen Reflex to HCV RNA Quant and Genotype; Future  - Trichomonas vaginalis by PCR; Future  - NEISSERIA GONORRHOEA PCR; Future  - CHLAMYDIA TRACHOMATIS PCR; Future    4. Nicotine use disorder  Not interested in quitting at this time    5. History of syphilis  Pt reports he was treated with two IM injections during a past incarceration.  RPR 1:1 5/2025.   Recommend repeating RPR titer 11/2025 and 5/2026.         Return in about 2 weeks (around 7/10/2025) for Follow up, in person before Sublocade #1.      Patient counseling completed today:  Discussed mechanism of action, potential risks/benefits/side effects of medications and other recommendations above.     Discussed risk of precipitated withdrawal with initiation of buprenorphine in the presence of full opioid agonists.    Reviewed directions for  initiation of buprenorphine to reduce risk of precipitated withdrawal and maximize efficacy.    Harm reduction counseling including never use alone, availability of naloxone, risks associated with concurrent use of opioids and benzodiazepines, alcohol, or other sedatives, safer administration as applicable.  Discussed importance of avoiding isolation, building a network of supportive relationships, avoiding people/places/things associated with past use to reduce risk of relapse; including motivational interviewing regarding psychosocial interventions.   SUBJECTIVE                                                          CC/HPI:  Chacho Lucas is a 19 year old male with PMH syphils s/p treatment while incarcerated, PTSD, ADHD, nicotine use disorder, and opioid use disorder on buprenorphine who presents to the [facility] for return visit.           Brief history of use:   Started using illicitly manufactured fentanyl tablets at age 15. Started with 1 pill per day. Taking 15 pills per day at first [facility] visit.  Prior to pills he was using cannabis and occasional xanax. Continues to smoke cannabis daily. He has tried to quit using pills twice, when he stopped he was taking Suboxone off the street.   First RC visit on 3/8/22, start on 8 mg TDD - up to 12 mg if needed.   He was lost follow-up,   returning to clinic on 7/25/22.  Has had continued inconsistent adherence to buprenorphine rx and ongoing use of fentanyl.  Started outpatient programming with [facility] 10/20/22.     Lost to follow up after 10/21/2022  [facility] 2/18/2025 and restarted  buprenorphine  Discharged from  3/11 due to behavioral issues (not attending group)  Returned to  and completed one month program 06-06/2025 6/26/25 HPI:  Pt has been home with his family since graduating  2 weeks ago.  He has continued to take buprenorphine 12mg/day.  Denies opioid cravings.  Is experiencing constipation and dry mouth as side effects.  He expresses interest  in transferring to [facility].   Reports he has been to a couple of support group meetings in the last 2 weeks.    He would like to repeat STI testing today.    He cannot recall exactly when he received treatment for syphilis in the past, but recalls getting two injections while he was incarcerated.  RPR titer was 1:1 when checked in 03/2025 and 05/2025.  Denies new partners recently.        Substance Use History:   Opioids:   Age at first use: 15  Current use: timing of last use: 3/2025; substance: pressed fentanyl pills; quantity 15 pills daily; route: smoke and snort                 IV drug use: No   History of overdose: No  Previous treatments : No  Longest period of sobriety: 2 weeks  Medical complications related to substance use: denies  Hepatitis C: 5/14/25 HCV ab nonreactive  HIV: 5/14/25 HIV ag/ab nonreactive     Other Addiction History:  Stimulants:   denies  Sedatives/hypnotics/anxiolytics:   [id number] use Xanax once per month   Alcohol:   drinking 4 beers, 3x per month. Last drink 4/23/25  Nicotine:   Cigarettes: occasional use.   Vaping: daily  Chewing tobacco: denies  Cannabis:    daily use, smoking, last use 5/11/25  Hallucinogens:   denies  Behavioral addictions:   Denies      PAST PSYCHIATRIC HISTORY:  Diagnoses- ADHD, PTDS  Suicide Attempts: No   Hospitalizations: No        5/14/2025     1:00 PM 5/28/2025    10:00 AM 6/26/2025     3:00 PM   PHQ   PHQ-9 Total Score 0 0 0   Q9: Thoughts of better off dead/self-harm past 2 weeks Not at all Not at all Not at all       Social History  Housing status: with grandparents, mother, siblings and uncle  Employment status: Unemployed, not seeking work  Relationship status: Single  Children: no children  Legal: on probation  Insurance needs: active         [place] Prescription Drug Monitoring Program Reviewed:  Yes  03/05/2025 03/05/2025 1 Suboxone 12 Mg-3 Mg Sl Film 30.00 30  [place] [id number] [place] ([id number]) 0/0 12.00 mg Medicaid [place]   02/19/2025 02/18/2025 1 Suboxone  12 Mg-3 Mg Sl Film 30.00 15 [place] [id number]            Medications:  Current Outpatient Medications   Medication Sig Dispense Refill    Buprenorphine HCl-Naloxone HCl (SUBOXONE) 12-3 MG FILM per film Place 1 Film under the tongue daily. 14 Film 0    melatonin 5 MG tablet Take 1 tablet (5 mg) by mouth nightly as needed for sleep. (Patient not taking: Reported on 6/26/2025) 30 tablet 0    naloxone (NARCAN) 4 MG/0.1ML nasal spray Spray 4 mg into one nostril alternating nostrils as needed for opioid reversal. every 2-3 minutes until assistance arrives      naloxone (NARCAN) 4 MG/0.1ML nasal spray Spray 1 spray (4 mg) into one nostril alternating nostrils once as needed for opioid reversal. every 2-3 minutes until assistance arrives 0.2 mL 11    nicotine polacrilex (NICORETTE) 4 MG gum 4 mg, buccal, every hour as needed for nicotine withdrawal symptoms. Chew until tingling, then place between cheek and gum. Repeat. Do not swallow. Not to exceed 96 mg (24 pieces) in a 24 hour period (Patient not taking: Reported on 6/26/2025) 200 each 1    ondansetron (ZOFRAN) 4 MG tablet Take 1 tablet (4 mg) by mouth every 8 hours as needed for nausea. (Patient not taking: Reported on 6/26/2025) 12 tablet 0    senna-docusate (SENOKOT-S/PERICOLACE) 8.6-50 MG tablet Take 2 tablets by mouth daily as needed for constipation. (Patient not taking: Reported on 6/26/2025) 60 tablet 1     No current facility-administered medications for this visit.     Facility-Administered Medications Ordered in Other Visits   Medication Dose Route Frequency Provider Last Rate Last Admin    Self Administer Medications: Behavioral Services   Does not apply See Admin Instructions Carly Hernandez MD           Allergies   Allergen Reactions    Peanut-Containing Drug Products Diarrhea     Denies true allergy       PMH, PSH, FamHx reviewed      OBJECTIVE                                                      BP 93/57   Pulse (!) 46   SpO2 99%     Physical  Exam  Constitutional:       General: He is not in acute distress.  Eyes:      General: No scleral icterus.     Extraocular Movements: Extraocular movements intact.      Conjunctiva/sclera: Conjunctivae normal.   Pulmonary:      Effort: Pulmonary effort is normal.   Neurological:      General: No focal deficit present.      Mental Status: He is alert and oriented to person, place, and time.   Psychiatric:         Attention and Perception: Attention normal.         Mood and Affect: Mood normal. Affect is not inappropriate.         Speech: Speech normal.         Behavior: Behavior is cooperative.         Thought Content: Thought content normal. Thought content does not include suicidal ideation.         Judgment: Judgment normal.         Labs:  *POC urine drug screen does not screen for Fentanyl      Recent Results (from the past 240 hours)   Drugs of Abuse Screen Urine (POC CUPS) POCT    Collection Time: 06/26/25  3:26 PM   Result Value Ref Range    POCT Kit Lot Number [id number]     POCT Kit Expiration Date 08/05/2026     Temperature Urine POCT 90 F 90 F, 92 F, 94 F, 96 F, 98 F, 100 F    Specific [place] POCT 1.015 1.005, 1.015, 1.025    pH Qual Urine POCT 5 pH 4 pH, 5 pH, 7 pH, 9 pH    Creatinine Qual Urine POCT 50 mg/dL 20 mg/dL, 50 mg/dL, 100 mg/dL, 200 mg/dL    Internal QC Qual Urine POCT Valid Valid    Amphetamine Qual Urine POCT Negative Negative    Barbiturate Qual Urine POCT Negative Negative    Buprenorphine Qual Urine POCT Screen Positive (A) Negative    Benzodiazepine Qual Urine POCT Negative Negative    Cocaine Qual Urine POCT Negative Negative    Methamphetamine Qual Urine POCT Negative Negative    MDMA Qual Urine POCT Negative Negative    Methadone Qual Urine POCT Negative Negative    Opiate Qual Urine POCT Negative Negative    Oxycodone Qual Urine POCT Negative Negative    Phencyclidine Qual Urine POCT Negative Negative    THC Qual Urine POCT Screen Positive (A) Negative           At least 40min spent  on day of visit in review of medical record,  review, obtaining histories, discussing recommendations, counseling, coordination of care     Continued Complex Management  The longitudinal plan of care for the diagnosis(es)/condition(s) as documented were addressed during this visit. Due to the added complexity in care, I will continue to support Wei in the subsequent management and with ongoing continuity of care.    Carly Hernandez MD  Addiction Medicine  [place] [id number] [address] [id number] [phone]

## 2025-06-26 NOTE — NURSING NOTE
[facility]      Rooming information:  Would like a prescription for Miralax and something for dry mouth  Approximate last use of full opioid agonist: 03/2025  Taking buprenorphine? Yes:   How much per day? 12-3 mg  Number of buprenorphine films/tablets remaining currently: 0  Side effects related to buprenorphine (constipation, dry mouth, sedation?) Yes: Constipation and dry mouth   Narcan currently available: Yes  Other recent substance use:    Denies  NICOTINE-Yes: Vape  If using nicotine, ready to quit? No    Point of care urine drug screen positive for:  Lab Results   Component Value Date    BUP Screen Positive (A) 05/28/2025    BZO Negative 05/28/2025    BAR Negative 05/28/2025    CASIE Negative 05/28/2025    MAMP Negative 05/28/2025    AMP Negative 05/28/2025    MDMA Negative 05/28/2025    MTD Negative 05/28/2025    YNX507 Negative 05/28/2025    OXY Negative 05/28/2025    PCP Negative 05/28/2025    THC Screen Positive (A) 05/28/2025    TEMP 94 F 05/28/2025    SGPOCT 1.015 05/28/2025       *POC urine drug screen does not screen for Fentanyl    Depression Response    Patient completed the PHQ-9 assessment for depression and scored >9? No  Question 9 on the PHQ-9 was positive for suicidality? No  Does patient have current mental health provider? No  C-SSRS screener risk level. N/A      Is this a virtual visit? No    I personally notified the following: visit provider          6/2/2025     1:00 PM   PHQ Assesment Total Score(s)   PHQ-2 Score 0         Housing needs: Stable    Insurance: Active    Current legal issues: Yes    Contact information up to date? Yes    3rd Party Involvement None today (please obtain MARYCARMEN if pt would like to include)    Jazz Colon RN  June 26, 2025  2:57 PM

## 2025-07-10 ENCOUNTER — INFUSION THERAPY VISIT (OUTPATIENT)
Dept: INFUSION THERAPY | Facility: CLINIC | Age: 20
End: 2025-07-10
Attending: FAMILY MEDICINE
Payer: COMMERCIAL

## 2025-07-10 VITALS — DIASTOLIC BLOOD PRESSURE: 65 MMHG | SYSTOLIC BLOOD PRESSURE: 106 MMHG | TEMPERATURE: 97.2 F | HEART RATE: 47 BPM

## 2025-07-10 DIAGNOSIS — F11.20 OPIOID USE DISORDER, SEVERE, DEPENDENCE (H): Primary | ICD-10-CM

## 2025-07-10 PROCEDURE — 96372 THER/PROPH/DIAG INJ SC/IM: CPT | Performed by: FAMILY MEDICINE

## 2025-07-10 PROCEDURE — 250N000009 HC RX 250: Performed by: FAMILY MEDICINE

## 2025-07-10 PROCEDURE — 250N000011 HC RX IP 250 OP 636: Mod: JZ | Performed by: FAMILY MEDICINE

## 2025-07-10 RX ORDER — LIDOCAINE HYDROCHLORIDE 10 MG/ML
2 INJECTION, SOLUTION EPIDURAL; INFILTRATION; INTRACAUDAL; PERINEURAL ONCE
Status: COMPLETED | OUTPATIENT
Start: 2025-07-10 | End: 2025-07-10

## 2025-07-10 RX ORDER — MEPERIDINE HYDROCHLORIDE 25 MG/ML
25 INJECTION INTRAMUSCULAR; INTRAVENOUS; SUBCUTANEOUS
OUTPATIENT
Start: 2025-08-07

## 2025-07-10 RX ORDER — ALBUTEROL SULFATE 0.83 MG/ML
2.5 SOLUTION RESPIRATORY (INHALATION)
OUTPATIENT
Start: 2025-08-07

## 2025-07-10 RX ORDER — METHYLPREDNISOLONE SODIUM SUCCINATE 40 MG/ML
40 INJECTION INTRAMUSCULAR; INTRAVENOUS
Start: 2025-08-07

## 2025-07-10 RX ORDER — ALBUTEROL SULFATE 90 UG/1
1-2 INHALANT RESPIRATORY (INHALATION)
Start: 2025-08-07

## 2025-07-10 RX ORDER — EPINEPHRINE 1 MG/ML
0.3 INJECTION, SOLUTION, CONCENTRATE INTRAVENOUS EVERY 5 MIN PRN
OUTPATIENT
Start: 2025-08-07

## 2025-07-10 RX ORDER — DIPHENHYDRAMINE HYDROCHLORIDE 50 MG/ML
25 INJECTION, SOLUTION INTRAMUSCULAR; INTRAVENOUS
Start: 2025-08-07

## 2025-07-10 RX ORDER — LIDOCAINE HYDROCHLORIDE 10 MG/ML
2 INJECTION, SOLUTION EPIDURAL; INFILTRATION; INTRACAUDAL; PERINEURAL ONCE
OUTPATIENT
Start: 2025-08-07 | End: 2025-08-07

## 2025-07-10 RX ORDER — DIPHENHYDRAMINE HYDROCHLORIDE 50 MG/ML
50 INJECTION, SOLUTION INTRAMUSCULAR; INTRAVENOUS
Start: 2025-08-07

## 2025-07-10 RX ADMIN — LIDOCAINE HYDROCHLORIDE 2 ML: 10 INJECTION, SOLUTION EPIDURAL; INFILTRATION; INTRACAUDAL; PERINEURAL at 14:48

## 2025-07-10 RX ADMIN — BUPRENORPHINE 300 MG: 300 SOLUTION SUBCUTANEOUS at 14:54

## 2025-07-10 ASSESSMENT — PAIN SCALES - GENERAL: PAINLEVEL_OUTOF10: NO PAIN (0)

## 2025-07-10 NOTE — PROGRESS NOTES
Infusion Nursing Note:  Chacho Lucas presents today for sublocade 300 mg first dose.    Patient seen by provider today: No  Has appointment with [facility].  Advised patient to go there after this injection   present during visit today: Not Applicable.    Note: N/A.      Intravenous Access:  No Intravenous access/labs at this visit.    Treatment Conditions:  Denies opioid relapse last 7 days.  Using suboxone as directed.      Post Infusion Assessment:  Patient tolerated injection without incident.  Given in RLQ after 2ml subcutaneous xylocaine administered for local anesthesia.       Discharge Plan:   Patient discharged in stable condition accompanied by: mother.  Departure Mode: Ambulatory.  RTC 8/7/25.      Janay Connell RN

## 2025-08-08 ENCOUNTER — TELEPHONE (OUTPATIENT)
Dept: BEHAVIORAL HEALTH | Facility: CLINIC | Age: 20
End: 2025-08-08
Payer: COMMERCIAL

## (undated) RX ORDER — LIDOCAINE HYDROCHLORIDE 10 MG/ML
INJECTION, SOLUTION EPIDURAL; INFILTRATION; INTRACAUDAL; PERINEURAL
Status: DISPENSED
Start: 2025-07-10